# Patient Record
Sex: FEMALE | Race: WHITE | NOT HISPANIC OR LATINO | Employment: OTHER | URBAN - METROPOLITAN AREA
[De-identification: names, ages, dates, MRNs, and addresses within clinical notes are randomized per-mention and may not be internally consistent; named-entity substitution may affect disease eponyms.]

---

## 2023-09-27 DIAGNOSIS — L65.9 HAIR LOSS: ICD-10-CM

## 2023-09-27 DIAGNOSIS — M81.0 OSTEOPOROSIS, UNSPECIFIED OSTEOPOROSIS TYPE, UNSPECIFIED PATHOLOGICAL FRACTURE PRESENCE: ICD-10-CM

## 2023-09-27 DIAGNOSIS — R53.83 FATIGUE, UNSPECIFIED TYPE: ICD-10-CM

## 2023-09-27 DIAGNOSIS — E55.9 VITAMIN D DEFICIENCY: ICD-10-CM

## 2023-09-27 DIAGNOSIS — R71.8 ABNORMALITY OF RED BLOOD CELLS: ICD-10-CM

## 2023-09-27 DIAGNOSIS — E78.00 HYPERCHOLESTEROLEMIA: ICD-10-CM

## 2023-09-30 ENCOUNTER — LAB (OUTPATIENT)
Dept: LAB | Facility: LAB | Age: 83
End: 2023-09-30
Payer: MEDICARE

## 2023-09-30 DIAGNOSIS — R53.83 FATIGUE, UNSPECIFIED TYPE: ICD-10-CM

## 2023-09-30 DIAGNOSIS — L65.9 HAIR LOSS: ICD-10-CM

## 2023-09-30 DIAGNOSIS — R71.8 ABNORMALITY OF RED BLOOD CELLS: ICD-10-CM

## 2023-09-30 DIAGNOSIS — E78.00 HYPERCHOLESTEROLEMIA: ICD-10-CM

## 2023-09-30 DIAGNOSIS — E55.9 VITAMIN D DEFICIENCY: ICD-10-CM

## 2023-09-30 LAB
ALBUMIN SERPL BCP-MCNC: 4.2 G/DL (ref 3.4–5)
ALP SERPL-CCNC: 97 U/L (ref 33–136)
ALT SERPL W P-5'-P-CCNC: 24 U/L (ref 7–45)
ANION GAP SERPL CALC-SCNC: 16 MMOL/L (ref 10–20)
AST SERPL W P-5'-P-CCNC: 39 U/L (ref 9–39)
BILIRUB SERPL-MCNC: 0.7 MG/DL (ref 0–1.2)
BUN SERPL-MCNC: 13 MG/DL (ref 6–23)
CALCIUM SERPL-MCNC: 9.3 MG/DL (ref 8.6–10.3)
CHLORIDE SERPL-SCNC: 98 MMOL/L (ref 98–107)
CHOLEST SERPL-MCNC: 277 MG/DL (ref 0–199)
CHOLESTEROL/HDL RATIO: 2.5
CO2 SERPL-SCNC: 29 MMOL/L (ref 21–32)
CREAT SERPL-MCNC: 0.67 MG/DL (ref 0.5–1.05)
ERYTHROCYTE [DISTWIDTH] IN BLOOD BY AUTOMATED COUNT: 14.8 % (ref 11.5–14.5)
GFR SERPL CREATININE-BSD FRML MDRD: 88 ML/MIN/1.73M*2
GLUCOSE SERPL-MCNC: 81 MG/DL (ref 74–99)
HCT VFR BLD AUTO: 46.7 % (ref 36–46)
HDLC SERPL-MCNC: 109.4 MG/DL
HGB BLD-MCNC: 15 G/DL (ref 12–16)
LDLC SERPL CALC-MCNC: 155 MG/DL (ref 140–190)
MCH RBC QN AUTO: 29.9 PG (ref 26–34)
MCHC RBC AUTO-ENTMCNC: 32.1 G/DL (ref 32–36)
MCV RBC AUTO: 93 FL (ref 80–100)
NON HDL CHOLESTEROL: 168 MG/DL (ref 0–149)
NRBC BLD-RTO: 0 /100 WBCS (ref 0–0)
PLATELET # BLD AUTO: 247 X10*3/UL (ref 150–450)
PMV BLD AUTO: 11.5 FL (ref 7.5–11.5)
POTASSIUM SERPL-SCNC: 5 MMOL/L (ref 3.5–5.3)
PROT SERPL-MCNC: 6.5 G/DL (ref 6.4–8.2)
RBC # BLD AUTO: 5.01 X10*6/UL (ref 4–5.2)
SODIUM SERPL-SCNC: 138 MMOL/L (ref 136–145)
TRIGL SERPL-MCNC: 65 MG/DL (ref 0–149)
TSH SERPL-ACNC: 2.66 MIU/L (ref 0.44–3.98)
VLDL: 13 MG/DL (ref 0–40)
WBC # BLD AUTO: 5 X10*3/UL (ref 4.4–11.3)

## 2023-09-30 PROCEDURE — 36415 COLL VENOUS BLD VENIPUNCTURE: CPT

## 2023-10-01 LAB — 25(OH)D3 SERPL-MCNC: 69 NG/ML (ref 30–100)

## 2023-10-02 RX ORDER — ACETAMINOPHEN, DEXTROMETHORPHAN HBR, DOXYLAMINE SUCCINATE, PHENYLEPHRINE HCL 650; 20; 12.5; 1 MG/30ML; MG/30ML; MG/30ML; MG/30ML
SOLUTION ORAL
COMMUNITY
Start: 2011-01-27 | End: 2023-10-04 | Stop reason: ALTCHOICE

## 2023-10-02 RX ORDER — ZOLEDRONIC ACID 5 MG/100ML
INJECTION, SOLUTION INTRAVENOUS
COMMUNITY
Start: 2013-08-28 | End: 2023-10-04 | Stop reason: SDUPTHER

## 2023-10-02 RX ORDER — FINASTERIDE 5 MG/1
5 TABLET, FILM COATED ORAL DAILY
COMMUNITY
End: 2024-04-18 | Stop reason: ALTCHOICE

## 2023-10-02 RX ORDER — MULTIVITAMIN
1 TABLET ORAL
COMMUNITY
Start: 2012-04-12

## 2023-10-02 RX ORDER — OMEGA-3 FATTY ACIDS 1000 MG
CAPSULE ORAL
COMMUNITY
Start: 2012-04-12

## 2023-10-02 RX ORDER — SPIRONOLACTONE 50 MG/1
TABLET, FILM COATED ORAL EVERY 12 HOURS
COMMUNITY
End: 2023-10-04 | Stop reason: ALTCHOICE

## 2023-10-02 RX ORDER — ZINC GLUCONATE 100 MG
1 TABLET ORAL DAILY
COMMUNITY
Start: 2020-10-07 | End: 2023-10-04 | Stop reason: ALTCHOICE

## 2023-10-02 RX ORDER — ASPIRIN 81 MG/1
81 TABLET ORAL
COMMUNITY
End: 2023-10-04 | Stop reason: ALTCHOICE

## 2023-10-02 RX ORDER — VIT A/VIT C/VIT E/ZINC/COPPER 2148-113
TABLET ORAL
COMMUNITY
Start: 2020-10-07

## 2023-10-02 RX ORDER — VIT C/E/ZN/COPPR/LUTEIN/ZEAXAN 250MG-90MG
1000 CAPSULE ORAL
COMMUNITY
Start: 2012-03-29 | End: 2023-10-04 | Stop reason: ALTCHOICE

## 2023-10-04 ENCOUNTER — OFFICE VISIT (OUTPATIENT)
Dept: PRIMARY CARE | Facility: CLINIC | Age: 83
End: 2023-10-04
Payer: MEDICARE

## 2023-10-04 VITALS
SYSTOLIC BLOOD PRESSURE: 119 MMHG | DIASTOLIC BLOOD PRESSURE: 75 MMHG | HEART RATE: 86 BPM | HEIGHT: 59 IN | WEIGHT: 115 LBS | BODY MASS INDEX: 23.18 KG/M2 | OXYGEN SATURATION: 96 %

## 2023-10-04 DIAGNOSIS — Z00.00 ROUTINE GENERAL MEDICAL EXAMINATION AT HEALTH CARE FACILITY: ICD-10-CM

## 2023-10-04 DIAGNOSIS — Z00.00 MEDICARE ANNUAL WELLNESS VISIT, SUBSEQUENT: Primary | ICD-10-CM

## 2023-10-04 DIAGNOSIS — M85.89 OSTEOPENIA OF MULTIPLE SITES: ICD-10-CM

## 2023-10-04 PROCEDURE — 1160F RVW MEDS BY RX/DR IN RCRD: CPT | Performed by: NURSE PRACTITIONER

## 2023-10-04 PROCEDURE — G0008 ADMIN INFLUENZA VIRUS VAC: HCPCS | Performed by: NURSE PRACTITIONER

## 2023-10-04 PROCEDURE — 1159F MED LIST DOCD IN RCRD: CPT | Performed by: NURSE PRACTITIONER

## 2023-10-04 PROCEDURE — G0439 PPPS, SUBSEQ VISIT: HCPCS | Performed by: NURSE PRACTITIONER

## 2023-10-04 PROCEDURE — 99214 OFFICE O/P EST MOD 30 MIN: CPT | Performed by: NURSE PRACTITIONER

## 2023-10-04 PROCEDURE — 1170F FXNL STATUS ASSESSED: CPT | Performed by: NURSE PRACTITIONER

## 2023-10-04 PROCEDURE — 90662 IIV NO PRSV INCREASED AG IM: CPT | Performed by: NURSE PRACTITIONER

## 2023-10-04 PROCEDURE — 1126F AMNT PAIN NOTED NONE PRSNT: CPT | Performed by: NURSE PRACTITIONER

## 2023-10-04 RX ORDER — DIPHENHYDRAMINE HYDROCHLORIDE 50 MG/ML
50 INJECTION INTRAMUSCULAR; INTRAVENOUS AS NEEDED
Status: CANCELLED | OUTPATIENT
Start: 2023-10-04

## 2023-10-04 RX ORDER — ZOLEDRONIC ACID 5 MG/100ML
5 INJECTION, SOLUTION INTRAVENOUS ONCE
Qty: 100 ML | Refills: 0 | Status: SHIPPED | OUTPATIENT
Start: 2023-10-04 | End: 2024-05-28

## 2023-10-04 RX ORDER — HEPARIN 100 UNIT/ML
500 SYRINGE INTRAVENOUS AS NEEDED
Status: CANCELLED | OUTPATIENT
Start: 2023-10-04

## 2023-10-04 RX ORDER — ZOLEDRONIC ACID 5 MG/100ML
5 INJECTION, SOLUTION INTRAVENOUS ONCE
Status: CANCELLED | OUTPATIENT
Start: 2023-10-04

## 2023-10-04 RX ORDER — METHYLPREDNISOLONE SODIUM SUCCINATE 40 MG/ML
40 INJECTION INTRAMUSCULAR; INTRAVENOUS AS NEEDED
Status: CANCELLED | OUTPATIENT
Start: 2023-10-04

## 2023-10-04 RX ORDER — ALBUTEROL SULFATE 0.83 MG/ML
3 SOLUTION RESPIRATORY (INHALATION) AS NEEDED
Status: CANCELLED | OUTPATIENT
Start: 2023-10-04

## 2023-10-04 RX ORDER — EPINEPHRINE 0.3 MG/.3ML
0.3 INJECTION SUBCUTANEOUS EVERY 5 MIN PRN
Status: CANCELLED | OUTPATIENT
Start: 2023-10-04

## 2023-10-04 RX ORDER — HEPARIN SODIUM,PORCINE/PF 10 UNIT/ML
50 SYRINGE (ML) INTRAVENOUS AS NEEDED
Status: CANCELLED | OUTPATIENT
Start: 2023-10-04

## 2023-10-04 RX ORDER — FAMOTIDINE 10 MG/ML
20 INJECTION INTRAVENOUS ONCE AS NEEDED
Status: CANCELLED | OUTPATIENT
Start: 2023-10-04

## 2023-10-04 ASSESSMENT — ENCOUNTER SYMPTOMS
PALPITATIONS: 0
HEADACHES: 0
SORE THROAT: 0
NUMBNESS: 0
ABDOMINAL PAIN: 0
SHORTNESS OF BREATH: 0
MUSCULOSKELETAL NEGATIVE: 1
FATIGUE: 0
WEAKNESS: 0
COUGH: 0
CONSTIPATION: 0
VOMITING: 0
DIARRHEA: 0
PSYCHIATRIC NEGATIVE: 1
DIZZINESS: 0
FEVER: 0
CHILLS: 0
NAUSEA: 0

## 2023-10-04 ASSESSMENT — PATIENT HEALTH QUESTIONNAIRE - PHQ9
1. LITTLE INTEREST OR PLEASURE IN DOING THINGS: NOT AT ALL
2. FEELING DOWN, DEPRESSED OR HOPELESS: NOT AT ALL
SUM OF ALL RESPONSES TO PHQ9 QUESTIONS 1 AND 2: 0

## 2023-10-04 ASSESSMENT — ACTIVITIES OF DAILY LIVING (ADL)
DOING_HOUSEWORK: INDEPENDENT
MANAGING_FINANCES: INDEPENDENT
GROCERY_SHOPPING: INDEPENDENT
BATHING: INDEPENDENT
TAKING_MEDICATION: INDEPENDENT
DRESSING: INDEPENDENT

## 2023-10-04 NOTE — PROGRESS NOTES
"Subjective   Patient ID: Monica Arriaga is a 83 y.o. female who presents for medicare wellness and follow up,      HPI   Here for annual visit.  Overall feeling well.  Denies chest pain, SOB, palpitations, dizziness,  or GI issues.  Taking no prescriptions, except Reclast infusion.  Stays active.  Vital signs are stable.  Sees specialist for macular degeneration      Review of Systems   Constitutional:  Negative for chills, fatigue and fever.   HENT:  Negative for congestion, ear pain and sore throat.    Eyes:  Negative for visual disturbance.   Respiratory:  Negative for cough and shortness of breath.    Cardiovascular:  Negative for chest pain, palpitations and leg swelling.   Gastrointestinal:  Negative for abdominal pain, constipation, diarrhea, nausea and vomiting.   Genitourinary: Negative.    Musculoskeletal: Negative.    Skin:  Negative for rash.   Neurological:  Negative for dizziness, weakness, numbness and headaches.   Psychiatric/Behavioral: Negative.         Objective   /75   Pulse 86   Ht 1.499 m (4' 11\")   Wt 52.2 kg (115 lb)   SpO2 96%   BMI 23.23 kg/m²     Physical Exam  Constitutional:       General: She is not in acute distress.     Appearance: Normal appearance.   HENT:      Head: Normocephalic and atraumatic.      Right Ear: Tympanic membrane, ear canal and external ear normal.      Left Ear: Tympanic membrane, ear canal and external ear normal.      Nose: Nose normal.      Mouth/Throat:      Mouth: Mucous membranes are moist.      Pharynx: Oropharynx is clear.   Eyes:      Extraocular Movements: Extraocular movements intact.      Conjunctiva/sclera: Conjunctivae normal.      Pupils: Pupils are equal, round, and reactive to light.   Cardiovascular:      Rate and Rhythm: Normal rate and regular rhythm.      Pulses: Normal pulses.      Heart sounds: Normal heart sounds. No murmur heard.  Pulmonary:      Effort: Pulmonary effort is normal.      Breath sounds: Normal breath sounds. No " wheezing, rhonchi or rales.   Abdominal:      General: Bowel sounds are normal.      Palpations: Abdomen is soft.      Tenderness: There is no abdominal tenderness.   Musculoskeletal:         General: Normal range of motion.      Cervical back: Normal range of motion and neck supple.   Lymphadenopathy:      Comments: No lymphadenopathy noted   Skin:     General: Skin is warm and dry.      Findings: No rash.   Neurological:      General: No focal deficit present.      Mental Status: She is alert and oriented to person, place, and time.      Cranial Nerves: No cranial nerve deficit.      Coordination: Coordination normal.      Gait: Gait normal.   Psychiatric:         Mood and Affect: Mood normal.         Behavior: Behavior normal.         Assessment/Plan   Problem List Items Addressed This Visit             ICD-10-CM    Osteopenia of multiple sites M85.89     Reclast therapy as prescribed for [facility] center  Repeat bone density next year 2024         Routine general medical examination at [facility] care facility - Primary Z00.00   Reviewed recent labs.  All stable  Mammogram: Defers mammogram  Colonoscopy: Aged colon screening  Flu: Had today 10/4/2023  Shingle  Pneumonia  Follow up in 1 year or sooner if needed

## 2023-10-18 DIAGNOSIS — M85.89 OSTEOPENIA OF MULTIPLE SITES: Primary | ICD-10-CM

## 2023-10-18 RX ORDER — FAMOTIDINE 10 MG/ML
20 INJECTION INTRAVENOUS ONCE AS NEEDED
OUTPATIENT
Start: 2023-10-18

## 2023-10-18 RX ORDER — DIPHENHYDRAMINE HYDROCHLORIDE 50 MG/ML
50 INJECTION INTRAMUSCULAR; INTRAVENOUS AS NEEDED
OUTPATIENT
Start: 2023-10-18

## 2023-10-18 RX ORDER — ZOLEDRONIC ACID 5 MG/100ML
5 INJECTION, SOLUTION INTRAVENOUS ONCE
OUTPATIENT
Start: 2023-10-18

## 2023-10-18 RX ORDER — ALBUTEROL SULFATE 0.83 MG/ML
3 SOLUTION RESPIRATORY (INHALATION) AS NEEDED
OUTPATIENT
Start: 2023-10-18

## 2023-10-18 RX ORDER — EPINEPHRINE 0.3 MG/.3ML
0.3 INJECTION SUBCUTANEOUS EVERY 5 MIN PRN
OUTPATIENT
Start: 2023-10-18

## 2023-11-01 DIAGNOSIS — M85.89 OSTEOPENIA OF MULTIPLE SITES: ICD-10-CM

## 2023-11-06 ENCOUNTER — APPOINTMENT (OUTPATIENT)
Dept: HEMATOLOGY/ONCOLOGY | Facility: CLINIC | Age: 83
End: 2023-11-06
Payer: MEDICARE

## 2023-11-15 ENCOUNTER — INFUSION (OUTPATIENT)
Dept: HEMATOLOGY/ONCOLOGY | Facility: CLINIC | Age: 83
End: 2023-11-15
Payer: MEDICARE

## 2023-11-15 VITALS
HEIGHT: 60 IN | TEMPERATURE: 97.5 F | RESPIRATION RATE: 16 BRPM | HEART RATE: 75 BPM | OXYGEN SATURATION: 97 % | WEIGHT: 113.32 LBS | SYSTOLIC BLOOD PRESSURE: 115 MMHG | DIASTOLIC BLOOD PRESSURE: 75 MMHG | BODY MASS INDEX: 22.25 KG/M2

## 2023-11-15 DIAGNOSIS — Z00.00 ROUTINE GENERAL MEDICAL EXAMINATION AT HEALTH CARE FACILITY: ICD-10-CM

## 2023-11-15 DIAGNOSIS — M85.89 OSTEOPENIA OF MULTIPLE SITES: ICD-10-CM

## 2023-11-15 LAB
ALBUMIN SERPL BCP-MCNC: 4.2 G/DL (ref 3.4–5)
ALP SERPL-CCNC: 95 U/L (ref 33–136)
ALT SERPL W P-5'-P-CCNC: 22 U/L (ref 7–45)
ANION GAP SERPL CALC-SCNC: 12 MMOL/L (ref 10–20)
AST SERPL W P-5'-P-CCNC: 30 U/L (ref 9–39)
BILIRUB SERPL-MCNC: 0.5 MG/DL (ref 0–1.2)
BUN SERPL-MCNC: 12 MG/DL (ref 6–23)
CALCIUM SERPL-MCNC: 9.2 MG/DL (ref 8.6–10.3)
CHLORIDE SERPL-SCNC: 101 MMOL/L (ref 98–107)
CO2 SERPL-SCNC: 32 MMOL/L (ref 21–32)
CREAT SERPL-MCNC: 0.71 MG/DL (ref 0.5–1.05)
GFR SERPL CREATININE-BSD FRML MDRD: 86 ML/MIN/1.73M*2
GLUCOSE SERPL-MCNC: 112 MG/DL (ref 74–99)
POTASSIUM SERPL-SCNC: 3.8 MMOL/L (ref 3.5–5.3)
PROT SERPL-MCNC: 6.3 G/DL (ref 6.4–8.2)
SODIUM SERPL-SCNC: 141 MMOL/L (ref 136–145)

## 2023-11-15 PROCEDURE — 96374 THER/PROPH/DIAG INJ IV PUSH: CPT | Mod: INF

## 2023-11-15 PROCEDURE — 36415 COLL VENOUS BLD VENIPUNCTURE: CPT

## 2023-11-15 PROCEDURE — 80053 COMPREHEN METABOLIC PANEL: CPT | Performed by: INTERNAL MEDICINE

## 2023-11-15 PROCEDURE — 2500000004 HC RX 250 GENERAL PHARMACY W/ HCPCS (ALT 636 FOR OP/ED): Performed by: NURSE PRACTITIONER

## 2023-11-15 RX ORDER — ZOLEDRONIC ACID 5 MG/100ML
5 INJECTION, SOLUTION INTRAVENOUS ONCE
Status: COMPLETED | OUTPATIENT
Start: 2023-11-15 | End: 2023-11-15

## 2023-11-15 RX ORDER — EPINEPHRINE 0.3 MG/.3ML
0.3 INJECTION SUBCUTANEOUS EVERY 5 MIN PRN
Status: DISCONTINUED | OUTPATIENT
Start: 2023-11-15 | End: 2023-11-15 | Stop reason: HOSPADM

## 2023-11-15 RX ORDER — EPINEPHRINE 0.3 MG/.3ML
0.3 INJECTION SUBCUTANEOUS EVERY 5 MIN PRN
OUTPATIENT
Start: 2023-11-15

## 2023-11-15 RX ORDER — DIPHENHYDRAMINE HYDROCHLORIDE 50 MG/ML
50 INJECTION INTRAMUSCULAR; INTRAVENOUS AS NEEDED
Status: DISCONTINUED | OUTPATIENT
Start: 2023-11-15 | End: 2023-11-15 | Stop reason: HOSPADM

## 2023-11-15 RX ORDER — ALBUTEROL SULFATE 0.83 MG/ML
3 SOLUTION RESPIRATORY (INHALATION) AS NEEDED
OUTPATIENT
Start: 2023-11-15

## 2023-11-15 RX ORDER — HEPARIN SODIUM,PORCINE/PF 10 UNIT/ML
50 SYRINGE (ML) INTRAVENOUS AS NEEDED
OUTPATIENT
Start: 2023-11-15

## 2023-11-15 RX ORDER — ALBUTEROL SULFATE 0.83 MG/ML
3 SOLUTION RESPIRATORY (INHALATION) AS NEEDED
Status: DISCONTINUED | OUTPATIENT
Start: 2023-11-15 | End: 2023-11-15 | Stop reason: HOSPADM

## 2023-11-15 RX ORDER — DIPHENHYDRAMINE HYDROCHLORIDE 50 MG/ML
50 INJECTION INTRAMUSCULAR; INTRAVENOUS AS NEEDED
OUTPATIENT
Start: 2023-11-15

## 2023-11-15 RX ORDER — FAMOTIDINE 10 MG/ML
20 INJECTION INTRAVENOUS ONCE AS NEEDED
OUTPATIENT
Start: 2023-11-15

## 2023-11-15 RX ORDER — HEPARIN 100 UNIT/ML
500 SYRINGE INTRAVENOUS AS NEEDED
OUTPATIENT
Start: 2023-11-15

## 2023-11-15 RX ORDER — ZOLEDRONIC ACID 5 MG/100ML
5 INJECTION, SOLUTION INTRAVENOUS ONCE
Status: CANCELLED | OUTPATIENT
Start: 2023-11-15

## 2023-11-15 RX ORDER — FAMOTIDINE 10 MG/ML
20 INJECTION INTRAVENOUS ONCE AS NEEDED
Status: DISCONTINUED | OUTPATIENT
Start: 2023-11-15 | End: 2023-11-15 | Stop reason: HOSPADM

## 2023-11-15 RX ADMIN — ZOLEDRONIC ACID 5 MG: 0.05 INJECTION, SOLUTION INTRAVENOUS at 11:55

## 2023-11-15 ASSESSMENT — PAIN SCALES - GENERAL: PAINLEVEL: 0-NO PAIN

## 2023-12-07 PROBLEM — E55.9 VITAMIN D DEFICIENCY: Status: ACTIVE | Noted: 2023-12-07

## 2023-12-07 PROBLEM — M15.9 OSTEOARTHRITIS, GENERALIZED: Status: ACTIVE | Noted: 2023-12-07

## 2023-12-07 PROBLEM — S06.5XAA SUBDURAL HEMATOMA (MULTI): Status: ACTIVE | Noted: 2023-12-07

## 2023-12-07 PROBLEM — R74.8 ABNORMAL LIVER ENZYMES: Status: ACTIVE | Noted: 2023-12-07

## 2023-12-07 PROBLEM — M51.379 DEGENERATION OF INTERVERTEBRAL DISC OF LUMBOSACRAL REGION: Status: ACTIVE | Noted: 2023-12-07

## 2023-12-07 PROBLEM — M47.16 LUMBAR SPONDYLOSIS WITH MYELOPATHY: Status: ACTIVE | Noted: 2023-12-07

## 2023-12-07 PROBLEM — M75.22 BICEPS TENDINITIS OF LEFT SHOULDER: Status: ACTIVE | Noted: 2023-12-07

## 2023-12-07 PROBLEM — H35.30 MACULAR DEGENERATION: Status: ACTIVE | Noted: 2023-12-07

## 2023-12-07 PROBLEM — S01.01XA SCALP LACERATION: Status: ACTIVE | Noted: 2023-12-07

## 2023-12-07 PROBLEM — R71.8 ABNORMALITY OF RED BLOOD CELLS: Status: ACTIVE | Noted: 2023-12-07

## 2023-12-07 PROBLEM — M17.11 PRIMARY OSTEOARTHRITIS OF RIGHT KNEE: Status: ACTIVE | Noted: 2023-12-07

## 2023-12-07 PROBLEM — M47.26 OSTEOARTHRITIS OF SPINE WITH RADICULOPATHY, LUMBAR REGION: Status: ACTIVE | Noted: 2023-12-07

## 2023-12-07 PROBLEM — S09.90XA CLOSED HEAD INJURY: Status: ACTIVE | Noted: 2023-12-07

## 2023-12-07 PROBLEM — H26.9 CATARACTA: Status: ACTIVE | Noted: 2023-12-07

## 2023-12-07 PROBLEM — L30.9 DERMATITIS: Status: ACTIVE | Noted: 2023-12-07

## 2023-12-07 PROBLEM — R06.09 DYSPNEA ON EXERTION: Status: ACTIVE | Noted: 2023-12-07

## 2023-12-07 PROBLEM — J02.9 SORE THROAT: Status: ACTIVE | Noted: 2023-12-07

## 2023-12-07 PROBLEM — M43.16 SPONDYLOLISTHESIS OF LUMBAR REGION: Status: ACTIVE | Noted: 2023-12-07

## 2023-12-07 PROBLEM — M51.37 DEGENERATION OF INTERVERTEBRAL DISC OF LUMBOSACRAL REGION: Status: ACTIVE | Noted: 2023-12-07

## 2023-12-07 PROBLEM — M25.549 HAND JOINT PAIN: Status: ACTIVE | Noted: 2023-12-07

## 2023-12-07 PROBLEM — M51.26 DISPLACEMENT OF LUMBAR INTERVERTEBRAL DISC: Status: ACTIVE | Noted: 2023-12-07

## 2023-12-07 PROBLEM — D75.1 POLYCYTHEMIA: Status: ACTIVE | Noted: 2023-12-07

## 2023-12-07 PROBLEM — R53.83 FATIGUE: Status: ACTIVE | Noted: 2023-12-07

## 2023-12-07 PROBLEM — M25.559 PAIN IN JOINT INVOLVING PELVIC REGION AND THIGH: Status: ACTIVE | Noted: 2023-12-07

## 2023-12-07 PROBLEM — M16.11 PRIMARY OSTEOARTHRITIS OF RIGHT HIP: Status: ACTIVE | Noted: 2023-12-07

## 2023-12-07 PROBLEM — L64.9 ANDROGENIC ALOPECIA: Status: ACTIVE | Noted: 2023-12-07

## 2023-12-07 PROBLEM — I31.9 PERICARDITIS (HHS-HCC): Status: ACTIVE | Noted: 2023-12-07

## 2023-12-07 PROBLEM — R79.82 ELEVATED C-REACTIVE PROTEIN: Status: ACTIVE | Noted: 2023-12-07

## 2024-04-18 ENCOUNTER — OFFICE VISIT (OUTPATIENT)
Dept: PRIMARY CARE | Facility: CLINIC | Age: 84
End: 2024-04-18
Payer: MEDICARE

## 2024-04-18 VITALS
OXYGEN SATURATION: 94 % | DIASTOLIC BLOOD PRESSURE: 70 MMHG | HEART RATE: 84 BPM | WEIGHT: 114.6 LBS | BODY MASS INDEX: 23.1 KG/M2 | HEIGHT: 59 IN | SYSTOLIC BLOOD PRESSURE: 110 MMHG

## 2024-04-18 DIAGNOSIS — J06.9 UPPER RESPIRATORY TRACT INFECTION, UNSPECIFIED TYPE: Primary | ICD-10-CM

## 2024-04-18 DIAGNOSIS — S06.5X9A TRAUMATIC SUBDURAL HEMORRHAGE WITH LOSS OF CONSCIOUSNESS OF UNSPECIFIED DURATION, INITIAL ENCOUNTER (MULTI): ICD-10-CM

## 2024-04-18 PROBLEM — S06.5XAA SUBDURAL HEMATOMA (MULTI): Status: RESOLVED | Noted: 2023-12-07 | Resolved: 2024-04-18

## 2024-04-18 PROCEDURE — 1124F ACP DISCUSS-NO DSCNMKR DOCD: CPT | Performed by: NURSE PRACTITIONER

## 2024-04-18 PROCEDURE — 1160F RVW MEDS BY RX/DR IN RCRD: CPT | Performed by: NURSE PRACTITIONER

## 2024-04-18 PROCEDURE — 1036F TOBACCO NON-USER: CPT | Performed by: NURSE PRACTITIONER

## 2024-04-18 PROCEDURE — 1159F MED LIST DOCD IN RCRD: CPT | Performed by: NURSE PRACTITIONER

## 2024-04-18 PROCEDURE — 99214 OFFICE O/P EST MOD 30 MIN: CPT | Performed by: NURSE PRACTITIONER

## 2024-04-18 RX ORDER — ALBUTEROL SULFATE 90 UG/1
2 AEROSOL, METERED RESPIRATORY (INHALATION) EVERY 4 HOURS PRN
Qty: 8 G | Refills: 1 | Status: SHIPPED | OUTPATIENT
Start: 2024-04-18 | End: 2024-04-18

## 2024-04-18 RX ORDER — DOXYCYCLINE 100 MG/1
100 CAPSULE ORAL 2 TIMES DAILY
Qty: 14 CAPSULE | Refills: 0 | Status: SHIPPED | OUTPATIENT
Start: 2024-04-18 | End: 2024-04-18 | Stop reason: WASHOUT

## 2024-04-18 RX ORDER — DOXYCYCLINE 100 MG/1
100 CAPSULE ORAL 2 TIMES DAILY
Qty: 14 CAPSULE | Refills: 0 | Status: SHIPPED | OUTPATIENT
Start: 2024-04-18 | End: 2024-04-25

## 2024-04-18 RX ORDER — ALBUTEROL SULFATE 90 UG/1
2 AEROSOL, METERED RESPIRATORY (INHALATION) EVERY 4 HOURS PRN
Qty: 8 G | Refills: 1 | Status: SHIPPED | OUTPATIENT
Start: 2024-04-18 | End: 2024-05-28

## 2024-04-18 ASSESSMENT — ENCOUNTER SYMPTOMS
CHOKING: 1
GASTROINTESTINAL NEGATIVE: 1
EYES NEGATIVE: 1
CONSTITUTIONAL NEGATIVE: 1
CARDIOVASCULAR NEGATIVE: 1
PSYCHIATRIC NEGATIVE: 1
NEUROLOGICAL NEGATIVE: 1
COUGH: 1

## 2024-04-18 ASSESSMENT — PATIENT HEALTH QUESTIONNAIRE - PHQ9
SUM OF ALL RESPONSES TO PHQ9 QUESTIONS 1 AND 2: 0
1. LITTLE INTEREST OR PLEASURE IN DOING THINGS: NOT AT ALL
2. FEELING DOWN, DEPRESSED OR HOPELESS: NOT AT ALL

## 2024-04-18 NOTE — PROGRESS NOTES
"Subjective   Patient ID: Monica Arriaga is a 83 y.o. female who presents for follow up.    HPI   Feeling better everyday.  Had episode of shingles, has cleared up.  Onset: 19 days ago  Symptoms: very productive cough of thick yellow sputum, choking on the phlegm  Sore throat: no  N/V/D: no  Fever: no  OTC medications: none  COVID Test: none taken    Review of Systems   Constitutional: Negative.    HENT:  Positive for congestion.    Eyes: Negative.    Respiratory:  Positive for cough and choking.    Cardiovascular: Negative.    Gastrointestinal: Negative.    Genitourinary: Negative.    Skin: Negative.    Neurological: Negative.    Psychiatric/Behavioral: Negative.         Objective   /70   Pulse 84   Ht 1.499 m (4' 11\")   Wt 52 kg (114 lb 9.6 oz)   SpO2 94%   BMI 23.15 kg/m²     Physical Exam  Constitutional:       General: She is not in acute distress.     Appearance: Normal appearance.   HENT:      Head: Normocephalic and atraumatic.      Right Ear: Tympanic membrane, ear canal and external ear normal.      Left Ear: Tympanic membrane, ear canal and external ear normal.      Nose: Nose normal.      Mouth/Throat:      Mouth: Mucous membranes are moist.      Pharynx: Oropharynx is clear.   Eyes:      Extraocular Movements: Extraocular movements intact.      Conjunctiva/sclera: Conjunctivae normal.      Pupils: Pupils are equal, round, and reactive to light.   Cardiovascular:      Rate and Rhythm: Normal rate and regular rhythm.      Pulses: Normal pulses.      Heart sounds: Normal heart sounds. No murmur heard.  Pulmonary:      Effort: Pulmonary effort is normal.      Breath sounds: Normal breath sounds. No wheezing, rhonchi or rales.   Abdominal:      General: Bowel sounds are normal.      Palpations: Abdomen is soft.      Tenderness: There is no abdominal tenderness.   Musculoskeletal:         General: Normal range of motion.      Cervical back: Normal range of motion and neck supple.   Lymphadenopathy: "      Comments: No lymphadenopathy noted   Skin:     General: Skin is warm and dry.      Findings: No rash.   Neurological:      General: No focal deficit present.      Mental Status: She is alert and oriented to person, place, and time.      Cranial Nerves: No cranial nerve deficit.      Coordination: Coordination normal.      Gait: Gait normal.   Psychiatric:         Mood and Affect: Mood normal.         Behavior: Behavior normal.         Assessment/Plan   Problem List Items Addressed This Visit             ICD-10-CM    Traumatic subdural hemorrhage with loss of consciousness of unspecified duration, initial encounter (Multi) S06.5X9A     Resolved still montior         Upper respiratory tract infection - Primary J06.9    Relevant Medications    doxycycline (Vibramycin) 100 mg capsule    albuterol (Ventolin HFA) 90 mcg/actuation inhaler   Follow up as needed

## 2024-05-28 ENCOUNTER — OFFICE VISIT (OUTPATIENT)
Dept: PRIMARY CARE | Facility: CLINIC | Age: 84
End: 2024-05-28
Payer: MEDICARE

## 2024-05-28 VITALS
SYSTOLIC BLOOD PRESSURE: 125 MMHG | WEIGHT: 114 LBS | DIASTOLIC BLOOD PRESSURE: 80 MMHG | HEART RATE: 105 BPM | HEIGHT: 59 IN | BODY MASS INDEX: 22.98 KG/M2

## 2024-05-28 DIAGNOSIS — R09.89 PHLEGM IN THROAT: ICD-10-CM
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
## 2024-05-28 PROCEDURE — 1158F ADVNC CARE PLAN TLK DOCD: CPT | Performed by: NURSE PRACTITIONER

## 2024-05-28 PROCEDURE — 99214 OFFICE O/P EST MOD 30 MIN: CPT | Performed by: NURSE PRACTITIONER

## 2024-05-28 PROCEDURE — 1159F MED LIST DOCD IN RCRD: CPT | Performed by: NURSE PRACTITIONER

## 2024-05-28 PROCEDURE — 1160F RVW MEDS BY RX/DR IN RCRD: CPT | Performed by: NURSE PRACTITIONER

## 2024-05-28 PROCEDURE — 1123F ACP DISCUSS/DSCN MKR DOCD: CPT | Performed by: NURSE PRACTITIONER

## 2024-05-28 PROCEDURE — 1036F TOBACCO NON-USER: CPT | Performed by: NURSE PRACTITIONER

## 2024-05-28 RX ORDER — FINASTERIDE 5 MG/1
5 TABLET, FILM COATED ORAL DAILY
COMMUNITY
Start: 2024-05-23

## 2024-05-28 ASSESSMENT — PATIENT HEALTH QUESTIONNAIRE - PHQ9
2. FEELING DOWN, DEPRESSED OR HOPELESS: NOT AT ALL
SUM OF ALL RESPONSES TO PHQ9 QUESTIONS 1 AND 2: 0
1. LITTLE INTEREST OR PLEASURE IN DOING THINGS: NOT AT ALL

## 2024-05-28 NOTE — PROGRESS NOTES
"Subjective   Patient ID: Monica Arriaga is a 83 y.o. female who presents for bug bites.  Herpes Zoster       Noted a few itching red spots, arm thigh and back of knee.  Concerns it may be shingles outbreak.  Also still has phlegm in throat from illness.    Review of Systems   HENT:          Phlegm in throat   Skin:         Small red itchy bumps, one on outer right thigh and right forearm.  Several to back of left knee.  Itching and red.  Not open.       Objective   /80   Pulse 105   Ht 1.499 m (4' 11\")   Wt 51.7 kg (114 lb)   BMI 23.03 kg/m²     Physical Exam  Constitutional:       General: She is not in acute distress.     Appearance: Normal appearance.   HENT:      Head: Normocephalic and atraumatic.      Right Ear: Tympanic membrane, ear canal and external ear normal.      Left Ear: Tympanic membrane, ear canal and external ear normal.      Nose: Nose normal.      Mouth/Throat:      Mouth: Mucous membranes are moist.      Pharynx: Oropharynx is clear.   Eyes:      Extraocular Movements: Extraocular movements intact.      Conjunctiva/sclera: Conjunctivae normal.      Pupils: Pupils are equal, round, and reactive to light.   Cardiovascular:      Rate and Rhythm: Normal rate and regular rhythm.      Pulses: Normal pulses.      Heart sounds: Normal heart sounds. No murmur heard.  Pulmonary:      Effort: Pulmonary effort is normal.      Breath sounds: Normal breath sounds. No wheezing, rhonchi or rales.   Abdominal:      General: Bowel sounds are normal.      Palpations: Abdomen is soft.      Tenderness: There is no abdominal tenderness.   Musculoskeletal:         General: Normal range of motion.      Cervical back: Normal range of motion and neck supple.   Lymphadenopathy:      Comments: No lymphadenopathy noted   Skin:     General: Skin is warm and dry.      Findings: Lesion present. No rash.      Comments: Small red bite looking red spots. Not open.   Neurological:      General: No focal deficit present. "      Mental Status: She is alert and oriented to person, place, and time.      Cranial Nerves: No cranial nerve deficit.      Coordination: Coordination normal.      Gait: Gait normal.   Psychiatric:         Mood and Affect: Mood normal.         Behavior: Behavior normal.         Assessment/Plan   Problem List Items Addressed This Visit             ICD-10-CM    Bug bites - Primary W57.XXXA     Over the counter hydrocortisone         Phlegm in throat R09.89     Try mucinex and flonase for 2 weeks.  Will consider GERD treatment if does not resolve.        Follow up as needed.

## 2024-05-28 NOTE — PATIENT INSTRUCTIONS
Mucinex  Flonase  Try both for two weeks to see if it resolves.  May consider reflux medications if these don't act work.  Will try try to see to go away Flonase, sinus open      Bug bites:  Hydrocortisone cream over the counter cream as needed for itch.

## 2024-05-28 NOTE — PROGRESS NOTES
Patient Active Problem List   Diagnosis    Osteopenia of multiple sites    Routine general medical examination at health care facility    Abnormal liver enzymes    Androgenic alopecia    Biceps tendinitis of left shoulder    Cataracta    Closed head injury    Degeneration of intervertebral disc of lumbosacral region    Displacement of lumbar intervertebral disc    Dyspnea on exertion    Elevated C-reactive protein    Fatigue    Hair loss    Pain in joint involving pelvic region and thigh    Hand joint pain    Lumbar spondylosis with myelopathy    Macular degeneration    Osteoarthritis of spine with radiculopathy, lumbar region    Osteoarthritis, generalized    Other chest pain    Dermatitis    Pericarditis (HHS-HCC)    Polycythemia    Primary osteoarthritis of right hip    Primary osteoarthritis of right knee    Pure hypercholesterolemia    Scalp laceration    Abnormality of red blood cells    Senile osteoporosis    Sore throat    Spondylolisthesis of lumbar region    Vitamin D deficiency    Traumatic subdural hemorrhage with loss of consciousness of unspecified duration, initial encounter (Multi)    Upper respiratory tract infection    Bug bites    Phlegm in throat

## 2024-05-29 ENCOUNTER — APPOINTMENT (OUTPATIENT)
Dept: PRIMARY CARE | Facility: CLINIC | Age: 84
End: 2024-05-29
Payer: MEDICARE

## 2024-08-15 ENCOUNTER — APPOINTMENT (OUTPATIENT)
Dept: CARDIOLOGY | Facility: HOSPITAL | Age: 84
End: 2024-08-15
Payer: MEDICARE

## 2024-08-15 ENCOUNTER — APPOINTMENT (OUTPATIENT)
Dept: RADIOLOGY | Facility: HOSPITAL | Age: 84
End: 2024-08-15
Payer: MEDICARE

## 2024-08-15 ENCOUNTER — HOSPITAL ENCOUNTER (OUTPATIENT)
Facility: HOSPITAL | Age: 84
Setting detail: OBSERVATION
Discharge: HOME | End: 2024-08-17
Attending: EMERGENCY MEDICINE | Admitting: HOSPITALIST
Payer: MEDICARE

## 2024-08-15 DIAGNOSIS — R07.9 NONSPECIFIC CHEST PAIN: ICD-10-CM

## 2024-08-15 DIAGNOSIS — Z76.89 ESTABLISHING CARE WITH NEW DOCTOR, ENCOUNTER FOR: ICD-10-CM

## 2024-08-15 DIAGNOSIS — J18.9 MULTIFOCAL PNEUMONIA: Primary | ICD-10-CM

## 2024-08-15 DIAGNOSIS — R09.89 PHLEGM IN THROAT: ICD-10-CM

## 2024-08-15 LAB
ALBUMIN SERPL BCP-MCNC: 3.8 G/DL (ref 3.4–5)
ALP SERPL-CCNC: 160 U/L (ref 33–136)
ALT SERPL W P-5'-P-CCNC: 28 U/L (ref 7–45)
ANION GAP SERPL CALC-SCNC: 16 MMOL/L (ref 10–20)
APPEARANCE UR: CLEAR
AST SERPL W P-5'-P-CCNC: 40 U/L (ref 9–39)
BASOPHILS # BLD AUTO: 0.06 X10*3/UL (ref 0–0.1)
BASOPHILS NFR BLD AUTO: 0.5 %
BILIRUB SERPL-MCNC: 1.1 MG/DL (ref 0–1.2)
BILIRUB UR STRIP.AUTO-MCNC: NEGATIVE MG/DL
BNP SERPL-MCNC: 224 PG/ML (ref 0–99)
BUN SERPL-MCNC: 9 MG/DL (ref 6–23)
CALCIUM SERPL-MCNC: 8.8 MG/DL (ref 8.6–10.3)
CARDIAC TROPONIN I PNL SERPL HS: 7 NG/L (ref 0–13)
CHLORIDE SERPL-SCNC: 101 MMOL/L (ref 98–107)
CO2 SERPL-SCNC: 27 MMOL/L (ref 21–32)
COLOR UR: YELLOW
CREAT SERPL-MCNC: 0.6 MG/DL (ref 0.5–1.05)
EGFRCR SERPLBLD CKD-EPI 2021: 90 ML/MIN/1.73M*2
EOSINOPHIL # BLD AUTO: 0.07 X10*3/UL (ref 0–0.4)
EOSINOPHIL NFR BLD AUTO: 0.6 %
ERYTHROCYTE [DISTWIDTH] IN BLOOD BY AUTOMATED COUNT: 14.3 % (ref 11.5–14.5)
GLUCOSE SERPL-MCNC: 97 MG/DL (ref 74–99)
GLUCOSE UR STRIP.AUTO-MCNC: NORMAL MG/DL
HCT VFR BLD AUTO: 47.6 % (ref 36–46)
HGB BLD-MCNC: 16.1 G/DL (ref 12–16)
IMM GRANULOCYTES # BLD AUTO: 0.05 X10*3/UL (ref 0–0.5)
IMM GRANULOCYTES NFR BLD AUTO: 0.4 % (ref 0–0.9)
INR PPP: 1.1 (ref 0.9–1.1)
KETONES UR STRIP.AUTO-MCNC: ABNORMAL MG/DL
LEUKOCYTE ESTERASE UR QL STRIP.AUTO: ABNORMAL
LIPASE SERPL-CCNC: 6 U/L (ref 9–82)
LYMPHOCYTES # BLD AUTO: 1.12 X10*3/UL (ref 0.8–3)
LYMPHOCYTES NFR BLD AUTO: 9.2 %
MAGNESIUM SERPL-MCNC: 1.8 MG/DL (ref 1.6–2.4)
MCH RBC QN AUTO: 30.2 PG (ref 26–34)
MCHC RBC AUTO-ENTMCNC: 33.8 G/DL (ref 32–36)
MCV RBC AUTO: 89 FL (ref 80–100)
MONOCYTES # BLD AUTO: 1.71 X10*3/UL (ref 0.05–0.8)
MONOCYTES NFR BLD AUTO: 14.1 %
NEUTROPHILS # BLD AUTO: 9.12 X10*3/UL (ref 1.6–5.5)
NEUTROPHILS NFR BLD AUTO: 75.2 %
NITRITE UR QL STRIP.AUTO: NEGATIVE
NRBC BLD-RTO: 0 /100 WBCS (ref 0–0)
PH UR STRIP.AUTO: 6.5 [PH]
PLATELET # BLD AUTO: 240 X10*3/UL (ref 150–450)
POTASSIUM SERPL-SCNC: 3.5 MMOL/L (ref 3.5–5.3)
PROT SERPL-MCNC: 6.7 G/DL (ref 6.4–8.2)
PROT UR STRIP.AUTO-MCNC: NEGATIVE MG/DL
PROTHROMBIN TIME: 12.5 SECONDS (ref 9.8–12.8)
RBC # BLD AUTO: 5.33 X10*6/UL (ref 4–5.2)
RBC # UR STRIP.AUTO: NEGATIVE /UL
RBC #/AREA URNS AUTO: NORMAL /HPF
SARS-COV-2 RNA RESP QL NAA+PROBE: NOT DETECTED
SODIUM SERPL-SCNC: 140 MMOL/L (ref 136–145)
SP GR UR STRIP.AUTO: 1.01
SQUAMOUS #/AREA URNS AUTO: NORMAL /HPF
UROBILINOGEN UR STRIP.AUTO-MCNC: NORMAL MG/DL
WBC # BLD AUTO: 12.1 X10*3/UL (ref 4.4–11.3)
WBC #/AREA URNS AUTO: NORMAL /HPF
WBC CLUMPS #/AREA URNS AUTO: NORMAL /HPF

## 2024-08-15 PROCEDURE — 71250 CT THORAX DX C-: CPT | Performed by: RADIOLOGY

## 2024-08-15 PROCEDURE — 93005 ELECTROCARDIOGRAM TRACING: CPT

## 2024-08-15 PROCEDURE — G0378 HOSPITAL OBSERVATION PER HR: HCPCS

## 2024-08-15 PROCEDURE — 83880 ASSAY OF NATRIURETIC PEPTIDE: CPT | Performed by: HEALTH CARE PROVIDER

## 2024-08-15 PROCEDURE — 87899 AGENT NOS ASSAY W/OPTIC: CPT | Mod: GEALAB

## 2024-08-15 PROCEDURE — 85610 PROTHROMBIN TIME: CPT | Performed by: HEALTH CARE PROVIDER

## 2024-08-15 PROCEDURE — 83735 ASSAY OF MAGNESIUM: CPT | Performed by: EMERGENCY MEDICINE

## 2024-08-15 PROCEDURE — 87635 SARS-COV-2 COVID-19 AMP PRB: CPT | Performed by: HEALTH CARE PROVIDER

## 2024-08-15 PROCEDURE — 96365 THER/PROPH/DIAG IV INF INIT: CPT

## 2024-08-15 PROCEDURE — 87086 URINE CULTURE/COLONY COUNT: CPT | Mod: GEALAB | Performed by: HEALTH CARE PROVIDER

## 2024-08-15 PROCEDURE — 96368 THER/DIAG CONCURRENT INF: CPT

## 2024-08-15 PROCEDURE — 71046 X-RAY EXAM CHEST 2 VIEWS: CPT

## 2024-08-15 PROCEDURE — 87635 SARS-COV-2 COVID-19 AMP PRB: CPT | Performed by: EMERGENCY MEDICINE

## 2024-08-15 PROCEDURE — 94760 N-INVAS EAR/PLS OXIMETRY 1: CPT

## 2024-08-15 PROCEDURE — 36415 COLL VENOUS BLD VENIPUNCTURE: CPT | Performed by: EMERGENCY MEDICINE

## 2024-08-15 PROCEDURE — 71046 X-RAY EXAM CHEST 2 VIEWS: CPT | Performed by: RADIOLOGY

## 2024-08-15 PROCEDURE — 99285 EMERGENCY DEPT VISIT HI MDM: CPT | Mod: 25

## 2024-08-15 PROCEDURE — 85025 COMPLETE CBC W/AUTO DIFF WBC: CPT | Performed by: EMERGENCY MEDICINE

## 2024-08-15 PROCEDURE — 2500000001 HC RX 250 WO HCPCS SELF ADMINISTERED DRUGS (ALT 637 FOR MEDICARE OP)

## 2024-08-15 PROCEDURE — 71250 CT THORAX DX C-: CPT

## 2024-08-15 PROCEDURE — 83690 ASSAY OF LIPASE: CPT | Performed by: HEALTH CARE PROVIDER

## 2024-08-15 PROCEDURE — 84484 ASSAY OF TROPONIN QUANT: CPT | Performed by: EMERGENCY MEDICINE

## 2024-08-15 PROCEDURE — 87040 BLOOD CULTURE FOR BACTERIA: CPT | Mod: GEALAB | Performed by: HEALTH CARE PROVIDER

## 2024-08-15 PROCEDURE — 87449 NOS EACH ORGANISM AG IA: CPT | Mod: GEALAB

## 2024-08-15 PROCEDURE — 2500000004 HC RX 250 GENERAL PHARMACY W/ HCPCS (ALT 636 FOR OP/ED): Performed by: HEALTH CARE PROVIDER

## 2024-08-15 PROCEDURE — 36415 COLL VENOUS BLD VENIPUNCTURE: CPT | Performed by: HEALTH CARE PROVIDER

## 2024-08-15 PROCEDURE — 81001 URINALYSIS AUTO W/SCOPE: CPT | Performed by: HEALTH CARE PROVIDER

## 2024-08-15 PROCEDURE — 80053 COMPREHEN METABOLIC PANEL: CPT | Performed by: EMERGENCY MEDICINE

## 2024-08-15 RX ORDER — ACETAMINOPHEN 325 MG/1
650 TABLET ORAL EVERY 6 HOURS PRN
COMMUNITY

## 2024-08-15 RX ORDER — ENOXAPARIN SODIUM 100 MG/ML
40 INJECTION SUBCUTANEOUS EVERY 24 HOURS
Status: DISCONTINUED | OUTPATIENT
Start: 2024-08-15 | End: 2024-08-17 | Stop reason: HOSPADM

## 2024-08-15 RX ORDER — CEFTRIAXONE 2 G/50ML
2 INJECTION, SOLUTION INTRAVENOUS EVERY 24 HOURS
Status: DISCONTINUED | OUTPATIENT
Start: 2024-08-15 | End: 2024-08-15

## 2024-08-15 RX ORDER — CEFTRIAXONE 1 G/50ML
1 INJECTION, SOLUTION INTRAVENOUS EVERY 24 HOURS
Status: DISCONTINUED | OUTPATIENT
Start: 2024-08-16 | End: 2024-08-17 | Stop reason: HOSPADM

## 2024-08-15 RX ORDER — ALBUTEROL SULFATE 90 UG/1
2 INHALANT RESPIRATORY (INHALATION) EVERY 4 HOURS PRN
Status: DISCONTINUED | OUTPATIENT
Start: 2024-08-15 | End: 2024-08-17 | Stop reason: HOSPADM

## 2024-08-15 RX ORDER — AMOXICILLIN 250 MG
2 CAPSULE ORAL 2 TIMES DAILY
Status: DISCONTINUED | OUTPATIENT
Start: 2024-08-15 | End: 2024-08-17 | Stop reason: HOSPADM

## 2024-08-15 RX ORDER — IPRATROPIUM BROMIDE AND ALBUTEROL SULFATE 2.5; .5 MG/3ML; MG/3ML
3 SOLUTION RESPIRATORY (INHALATION) EVERY 6 HOURS PRN
Status: DISCONTINUED | OUTPATIENT
Start: 2024-08-15 | End: 2024-08-15

## 2024-08-15 RX ORDER — FINASTERIDE 5 MG/1
5 TABLET, FILM COATED ORAL DAILY
Status: DISCONTINUED | OUTPATIENT
Start: 2024-08-15 | End: 2024-08-17 | Stop reason: HOSPADM

## 2024-08-15 RX ORDER — IPRATROPIUM BROMIDE AND ALBUTEROL SULFATE 2.5; .5 MG/3ML; MG/3ML
3 SOLUTION RESPIRATORY (INHALATION) EVERY 2 HOUR PRN
Status: DISCONTINUED | OUTPATIENT
Start: 2024-08-15 | End: 2024-08-17 | Stop reason: HOSPADM

## 2024-08-15 RX ORDER — ACETAMINOPHEN 325 MG/1
650 TABLET ORAL EVERY 6 HOURS PRN
Status: DISCONTINUED | OUTPATIENT
Start: 2024-08-15 | End: 2024-08-17 | Stop reason: HOSPADM

## 2024-08-15 RX ORDER — IBUPROFEN 200 MG
600 TABLET ORAL EVERY 6 HOURS PRN
COMMUNITY

## 2024-08-15 SDOH — SOCIAL STABILITY: SOCIAL INSECURITY: HAVE YOU HAD THOUGHTS OF HARMING ANYONE ELSE?: NO

## 2024-08-15 SDOH — SOCIAL STABILITY: SOCIAL INSECURITY: HAS ANYONE EVER THREATENED TO HURT YOUR FAMILY OR YOUR PETS?: NO

## 2024-08-15 SDOH — SOCIAL STABILITY: SOCIAL INSECURITY: DOES ANYONE TRY TO KEEP YOU FROM HAVING/CONTACTING OTHER FRIENDS OR DOING THINGS OUTSIDE YOUR HOME?: NO

## 2024-08-15 SDOH — SOCIAL STABILITY: SOCIAL INSECURITY: ABUSE: ADULT

## 2024-08-15 SDOH — SOCIAL STABILITY: SOCIAL INSECURITY: DO YOU FEEL ANYONE HAS EXPLOITED OR TAKEN ADVANTAGE OF YOU FINANCIALLY OR OF YOUR PERSONAL PROPERTY?: NO

## 2024-08-15 SDOH — SOCIAL STABILITY: SOCIAL INSECURITY: DO YOU FEEL UNSAFE GOING BACK TO THE PLACE WHERE YOU ARE LIVING?: NO

## 2024-08-15 SDOH — SOCIAL STABILITY: SOCIAL INSECURITY: ARE YOU OR HAVE YOU BEEN THREATENED OR ABUSED PHYSICALLY, EMOTIONALLY, OR SEXUALLY BY ANYONE?: NO

## 2024-08-15 SDOH — SOCIAL STABILITY: SOCIAL INSECURITY: WERE YOU ABLE TO COMPLETE ALL THE BEHAVIORAL HEALTH SCREENINGS?: YES

## 2024-08-15 SDOH — SOCIAL STABILITY: SOCIAL INSECURITY: ARE THERE ANY APPARENT SIGNS OF INJURIES/BEHAVIORS THAT COULD BE RELATED TO ABUSE/NEGLECT?: NO

## 2024-08-15 SDOH — SOCIAL STABILITY: SOCIAL INSECURITY: HAVE YOU HAD ANY THOUGHTS OF HARMING ANYONE ELSE?: NO

## 2024-08-15 ASSESSMENT — COGNITIVE AND FUNCTIONAL STATUS - GENERAL
MOBILITY SCORE: 24
DAILY ACTIVITIY SCORE: 24
DAILY ACTIVITIY SCORE: 24
MOBILITY SCORE: 24
PATIENT BASELINE BEDBOUND: NO
MOBILITY SCORE: 24
DAILY ACTIVITIY SCORE: 24

## 2024-08-15 ASSESSMENT — ACTIVITIES OF DAILY LIVING (ADL)
PATIENT'S MEMORY ADEQUATE TO SAFELY COMPLETE DAILY ACTIVITIES?: YES
HEARING - LEFT EAR: FUNCTIONAL
WALKS IN HOME: INDEPENDENT
TOILETING: INDEPENDENT
HEARING - RIGHT EAR: FUNCTIONAL
BATHING: INDEPENDENT
GROOMING: INDEPENDENT
JUDGMENT_ADEQUATE_SAFELY_COMPLETE_DAILY_ACTIVITIES: YES
DRESSING YOURSELF: INDEPENDENT
ADEQUATE_TO_COMPLETE_ADL: YES
FEEDING YOURSELF: INDEPENDENT
LACK_OF_TRANSPORTATION: NO

## 2024-08-15 ASSESSMENT — LIFESTYLE VARIABLES
EVER FELT BAD OR GUILTY ABOUT YOUR DRINKING: NO
HOW OFTEN DO YOU HAVE 6 OR MORE DRINKS ON ONE OCCASION: NEVER
EVER HAD A DRINK FIRST THING IN THE MORNING TO STEADY YOUR NERVES TO GET RID OF A HANGOVER: NO
TOTAL SCORE: 0
SKIP TO QUESTIONS 9-10: 1
AUDIT-C TOTAL SCORE: 0
HAVE PEOPLE ANNOYED YOU BY CRITICIZING YOUR DRINKING: NO
HOW OFTEN DO YOU HAVE A DRINK CONTAINING ALCOHOL: NEVER
HAVE YOU EVER FELT YOU SHOULD CUT DOWN ON YOUR DRINKING: NO
HOW MANY STANDARD DRINKS CONTAINING ALCOHOL DO YOU HAVE ON A TYPICAL DAY: PATIENT DOES NOT DRINK
AUDIT-C TOTAL SCORE: 0

## 2024-08-15 ASSESSMENT — PAIN - FUNCTIONAL ASSESSMENT
PAIN_FUNCTIONAL_ASSESSMENT: 0-10

## 2024-08-15 ASSESSMENT — PAIN SCALES - GENERAL
PAINLEVEL_OUTOF10: 3
PAINLEVEL_OUTOF10: 4
PAINLEVEL_OUTOF10: 0 - NO PAIN

## 2024-08-15 ASSESSMENT — PAIN DESCRIPTION - LOCATION
LOCATION: CHEST
LOCATION: CHEST

## 2024-08-15 ASSESSMENT — COLUMBIA-SUICIDE SEVERITY RATING SCALE - C-SSRS
6. HAVE YOU EVER DONE ANYTHING, STARTED TO DO ANYTHING, OR PREPARED TO DO ANYTHING TO END YOUR LIFE?: NO
2. HAVE YOU ACTUALLY HAD ANY THOUGHTS OF KILLING YOURSELF?: NO
1. IN THE PAST MONTH, HAVE YOU WISHED YOU WERE DEAD OR WISHED YOU COULD GO TO SLEEP AND NOT WAKE UP?: NO

## 2024-08-15 ASSESSMENT — PAIN DESCRIPTION - DESCRIPTORS
DESCRIPTORS: ACHING

## 2024-08-15 ASSESSMENT — PATIENT HEALTH QUESTIONNAIRE - PHQ9
2. FEELING DOWN, DEPRESSED OR HOPELESS: NOT AT ALL
SUM OF ALL RESPONSES TO PHQ9 QUESTIONS 1 & 2: 0
1. LITTLE INTEREST OR PLEASURE IN DOING THINGS: NOT AT ALL

## 2024-08-15 ASSESSMENT — PAIN DESCRIPTION - PAIN TYPE: TYPE: ACUTE PAIN

## 2024-08-15 NOTE — PROGRESS NOTES
Pharmacy Medication History Review    Monica Arriaga is a 83 y.o. female admitted for No Principal Problem: There is no principal problem currently on the Problem List. Please update the Problem List and refresh.. Pharmacy reviewed the patient's [id number] medications and allergies for accuracy.    The list below reflectives the updated PTA list. Please review each medication in order reconciliation for additional clarification and justification.  Prior to Admission Medications   Prescriptions Last Dose Informant Patient Reported? Taking?   UNABLE TO FIND  Self Yes Yes   Sig: Take 1 capsule by mouth once daily. Med Name: Ultra eye vitamin   VITAMIN B COMPLEX ORAL 8/14/2024 at am Self Yes Yes   Sig: Take 1 tablet by mouth once daily.   acetaminophen (Tylenol) 325 mg tablet 8/15/2024 at am Self Yes Yes   Sig: Take 2 tablets (650 mg) by mouth every 6 hours if needed for mild pain (1 - 3).   albuterol (Ventolin HFA) 90 mcg/actuation inhaler Unknown  No Yes   Sig: Inhale 2 puffs every 4 hours if needed for wheezing or shortness of breath.   calcium carb-vit D3-magnesium 250-200-125 mg-unit-mg capsule 8/14/2024 at am Self Yes Yes   Sig: Take by mouth.   finasteride (Proscar) 5 mg tablet 8/14/2024 at am Self Yes Yes   Sig: Take 1 tablet (5 mg) by mouth once daily.   ibuprofen 200 mg tablet 8/14/2024 at pm Self Yes Yes   Sig: Take 3 tablets (600 mg) by mouth every 6 hours if needed for mild pain (1 - 3).   lactobacillus (Culturelle) 10 billion cell capsule 8/14/2024 at am Self Yes Yes   Sig: Take by mouth.   multivitamin tablet 8/14/2024 at am Self Yes Yes   Sig: Take 1 tablet by mouth once daily.   omega-3 1,000 mg capsule capsule 8/14/2024 at am Self Yes Yes   Sig: Take 2 to 4 g of DHA and EPA as a combined total daily.   zoledronic acid (Reclast) 5 mg/100 mL piggyback Not Taking  No No   Sig: Infuse 100 mL (5 mg) into a venous catheter 1 time for 1 dose.   Patient not taking: Reported on 8/15/2024       Facility-Administered Medications: None           The list below reflectives the updated allergy list. Please review each documented allergy for additional clarification and justification.  Allergies  Reviewed by Raudel Zaragoza RN on 8/15/2024        Severity Reactions Comments    Clindamycin High GI Upset, Diarrhea, Nausea/vomiting             Below are additional concerns with the patient's PTA list.    Navya Mora CPhT

## 2024-08-15 NOTE — H&P
"HPI    HPI: Monica Arriaga is a 83 y.o. female with PMHx pericarditis 5/22, endocarditis, osteoporosis, macular degeneration, polycythemia, and subdural hematoma who presented to the hospital for chest discomfort.    Symptoms began with sore throat and an episode of vertigo Saturday 8/10/24. Sore throat symptoms resolved in 2.5 days and then her \"head began to feel full.\" Last night she got home after mingling with friends as part of her [employer] club. At midnight, \"everything started to hurt so bad\" and last night she could not sleep because of her pain. Felt like she had a \"cold in the lining of my heart.\" She also had diarrhea all day yesterday.    Today, her chest still hurts, but it's \"not bad.\" Rates it at a 3/10 today. Has had a wet cough with yellow phlegm over the past week. Endorses fevers, chills, and night sweats overnight. Chest pain is worse when coughing. Endorse nausea, diarrhea, SOB and denies vomiting and abdominal pain.     Allergies: clindamycin  Surg hx: appendectomy  Social hx:   - lives at home by herself   - retired but owns rental properties   - remote smoking history from \"my 20s\"   - drinks 2 alcoholic beverages nightly   - No illicit drug use.    ED course:     Vitals: Temp 97.9, /70, HR 96,RR 18, Spo2 95% on RA    Labs:   -CBC - WBC 12.1, RBC 5.33, Hgb 16.1, Hct47.6, MCV 89, Platelet 240, PT 12.5, INR 1.1  -CMP - Glucose 97, Na 140, K 3.5, Cl 101, HCO3 27, BUN 9, Cr 0.6, Ca 8.8, Alk Phos 160, ALT 28, AST 40  -Troponin 7  - COVID negative  -   -UA - negative  -Lipase: 6    Imaging:   - CXR: small right basilar/RML infiltrate with small pleural effusion. Trace left pleural effusion.   - CT chest: probable multifocal pneumonia, probable mild reactive upper mediastinal adenopathy    Micro:   - Blood culture: pending  - Urine culture: pending  - COVID: negative    Interventions: started on ceftriaxone and azithromycin    ROS: 12 points review of system is negative except as stated " in the HPI above.   Past Medical History     Past Medical History:   Diagnosis Date    Other intervertebral disc degeneration, lumbar region     Degeneration of intervertebral disc of lumbar region    Unilateral primary osteoarthritis, unspecified knee 10/07/2020    Arthritis of knee      Surgical History   History reviewed. No pertinent surgical history.  Family History   No family history on file.  Social History     Social History     Socioeconomic History    Marital status: Single     Spouse name: Not on file    Number of children: Not on file    Years of education: Not on file    Highest education level: Not on file   Occupational History    Not on file   Tobacco Use    Smoking status: Never    Smokeless tobacco: Never   Vaping Use    Vaping status: Never Used   Substance and Sexual Activity    Alcohol use: Yes     Alcohol/week: 7.0 - 14.0 standard drinks of alcohol     Types: 7 - 14 Standard drinks or equivalent per week    Drug use: Never    Sexual activity: Not on file   Other Topics Concern    Not on file   Social History Narrative    Not on file     Social Determinants of Health     Financial Resource Strain: Not on file   Food Insecurity: Not on file (1/9/2024)   Transportation Needs: Not on file   Physical Activity: Not on file   Stress: Not on file   Social Connections: Unknown (5/17/2024)    Received from [facility], [facility] Social Needs Screening - Social Connection     Would you like help with any of the following needs: food, medicine/medical supplies, transportation, loneliness, housing or utilities?: Not on file   Intimate Partner Violence: Not on file   Housing Stability: Not on file       Tobacco Use: Low Risk  (8/15/2024)    Patient History     Smoking Tobacco Use: Never     Smokeless Tobacco Use: Never     Passive Exposure: Not on file        Social History     Substance and Sexual Activity   Alcohol Use Yes    Alcohol/week: 7.0 - 14.0 standard drinks of alcohol     Types: 7 - 14 Standard drinks or equivalent per week      Allergies     Allergies   Allergen Reactions    Clindamycin GI Upset, Diarrhea and Nausea/vomiting      Meds    Scheduled medications  azithromycin, 500 mg, intravenous, q24h  cefTRIAXone, 2 g, intravenous, q24h      Continuous medications     PRN medications     Objective     Vitals  Visit Vitals  /80   Pulse 84   Temp 36.6 °C (97.9 °F) (Temporal)   Resp 16   Ht 1.524 m (5')   Wt 52.6 kg (116 lb)   SpO2 96%   BMI 22.65 kg/m²   Smoking Status Never   BSA 1.49 m²        Physical Examination:  Physical Exam  Constitutional:       General: She is not in acute distress.     Appearance: Normal appearance.   Cardiovascular:      Rate and Rhythm: Normal rate and regular rhythm.      Pulses: Normal pulses.      Heart sounds: Normal heart sounds. No murmur heard.  Pulmonary:      Effort: Pulmonary effort is normal. No respiratory distress.      Breath sounds: Normal breath sounds.      Comments: Coarse crackles bilaterally in the dependent portions of the lung  Abdominal:      General: There is no distension.      Palpations: Abdomen is soft.      Tenderness: There is no abdominal tenderness.   Musculoskeletal:      Right lower leg: No edema.      Left lower leg: No edema.   Skin:     General: Skin is warm and dry.   Neurological:      General: No focal deficit present.      Mental Status: She is alert and oriented to person, place, and time.   Psychiatric:         Mood and Affect: Mood normal.         Behavior: Behavior normal.       I/Os  No intake or output data in the 24 hours ending 08/15/24 1404      Labs:   Results from last 72 hours   Lab Units 08/15/24  1008   SODIUM mmol/L 140   POTASSIUM mmol/L 3.5   CHLORIDE mmol/L 101   CO2 mmol/L 27   BUN mg/dL 9   CREATININE mg/dL 0.60   GLUCOSE mg/dL 97   CALCIUM mg/dL 8.8   ANION GAP mmol/L 16   EGFR mL/min/1.73m*2 90      Results from last 72 hours   Lab Units 08/15/24  1008   WBC AUTO x10*3/uL 12.1*  "  HEMOGLOBIN g/dL 16.1*   HEMATOCRIT % 47.6*   PLATELETS AUTO x10*3/uL 240   NEUTROS PCT AUTO % 75.2   LYMPHS PCT AUTO % 9.2   MONOS PCT AUTO % 14.1   EOS PCT AUTO % 0.6      Lab Results   Component Value Date    CALCIUM 8.8 08/15/2024    PHOS 2.9 08/14/2022      Lab Results   Component Value Date    CRP 0.87 09/04/2022      Micro/ID:   No results found for the last 90 days.                   No lab exists for component: \"AGALPCRNB\"     No results found for: \"URINECULTURE\", \"BLOODCULT\", \"CSFCULTSMEAR\"  Images    CT chest wo IV contrast  Narrative: Interpreted By:  Terrance Hull,   STUDY:  CT CHEST WO IV CONTRAST;  8/15/2024 11:48 am      INDICATION:  Signs/Symptoms:hx of pericarditis, pericardial effusion, acute  nonspecific chest discomfort.      COMPARISON:  04/09/2022      ACCESSION NUMBER(S):  [id number]      ORDERING CLINICIAN:  TANK JOLLY      TECHNIQUE:  Helical data acquisition of the chest was obtained  without IV  contrast material.  Images were reformatted in axial, coronal, and  sagittal planes.      FINDINGS:  LUNGS and AIRWAYS:  There several relatively focal consolidating infiltrates, most  notably extending to the middle lobe inferiorly with several air  bronchograms, and inferomedial aspect of the lower lobes bilaterally,  most likely due to pneumonia. There is also an area of ground-glass  opacity in the right upper lobe posterolaterally, probably additional  pneumonic infiltrate. However there is also a 7 mm nodular component  likely from focal consolidation/atelectasis, but for which follow-up  would be recommended in 3-6 months as a superimposed malignant nodule  is not excluded; and to demonstrate resolution of the ground-glass  opacity and exclude lepidic malignancy. There is a trace right  pleural effusion. There is also mild compressive atelectasis at the  lung bases..      MEDIASTINUM and JOSÉ MIGUEL, LOWER NECK AND AXILLA:  The visualized thyroid gland is within normal limits.      There " several relatively small upper mediastinal nodes, which appears  slightly larger than on the prior exam, most notably with a  pretracheal node measuring 9 mm on image 78 of series 202.  This is  probably from reactive hypertrophy, but attention at follow-up  recommended. No significant hilar adenopathy given limitations of an  unenhanced scan.      HEART and VESSELS:  The thoracic aorta is of normal course and caliber without  significant atherosclerotic calcification .      Main pulmonary artery and its branches are normal in caliber.      There is mild  coronary artery atherosclerotic calcification  primarily involving the LAD.      The cardiac chambers are not enlarged.      UPPER ABDOMEN:  The visualized subdiaphragmatic structures demonstrate no remarkable  findings.      CHEST WALL, OSSEOUS STRUCTURES AND OTHER FINDINGS:  There are no suspicious osseous lesions.      Impression: 1.  Probable multifocal pneumonia as described. However, relative  short-term follow-up would be recommended for the area of  ground-glass opacity at the right upper lobe with nodular component.  2. Probable mild reactive upper mediastinal adenopathy.      Signed by: Terrance Hull 8/15/2024 1:06 PM  Dictation workstation:   [id number]  XR chest 2 views  Narrative: Interpreted By:  Cinthya Luna,   STUDY:  XR CHEST 2 VIEWS;  8/15/2024 10:35 am      INDICATION:  Signs/Symptoms:cp.      COMPARISON:  09/03/2022      ACCESSION NUMBER(S):  [id number]      ORDERING CLINICIAN:  TANK JOLLY      FINDINGS:  Artifact from overlying monitoring leads noted. Hazy right lower  chest opacity with indistinctness of the diaphragm and blunting of  the costophrenic angle. Slight blunting of the left costophrenic  angle. The cardiac silhouette is within normal limits for size.  Degenerative endplate spurring in the thoracic spine.      Impression: Small right basilar/right middle lobe infiltrate with small pleural  effusion. trace left pleural  effusion. Follow-up to ensure resolution  after appropriate treatment recommended.      MACRO:  None.      Signed by: Cinthya Luna 8/15/2024 10:45 AM  Dictation workstation:   [id number]    Assessment and Plan    Monica Arriaga is a 83 y.o. female with PMHx pericarditis 5/22, endocarditis, osteoporosis, macular degeneration, polycythemia, and subdural hematoma admitted multifocal pneumonia and started on ceftriaxone and azithromycin.     Acute Medical Issues   #Multifocal pneumonia(Community acquired vs. Atypical) vs. Pericarditis  - Blood cultures x2 drawn in the ED  - Abx in ED: ceftriaxone, azithromycin  - Sputum culture pending  - Continue ceftriaxone and azithromycin  - Duonebs q4h  - Albuterol nebs q2h PRN  - Incentive spirometry q2h while awake  - Strep pneumo, legionella pending  - TTE pending    Chronic Medical Issues   #osteoporosis: acetaminophen 325 mg , ibuprofen 200 mg    F: PO intake & IVF PRN   E: Replete as needed   N: Regular diet  GI ppx: Protonix 40 mg daily  DVT ppx: Lovenox Subq  Antibiotics: Ceftriaxone, azithromycin   Tubes/Lines/Drains: PIV    Code Status: FULL CODE   Emergency Contact: Extended Emergency Contact Information  Primary Emergency Contact: Awa Lynn           [address] [place]  Mobile Phone: [phone]  Relation: Daughter  Secondary Emergency Contact: BobbyChristian  [place] Phone: [phone]  Relation: Friend     Disposition: 83 y.o.female admitted for multifocal pneumonia. Estimated length of stay less than 48 hrs.     Donte Bain MD  PGY-1 Transitional

## 2024-08-15 NOTE — PROGRESS NOTES
08/15/24 1518   Discharge Planning   Living Arrangements Alone   Support Systems Friends/neighbors   Assistance Needed A&OX3; independent with ADLs with no DME; drives; room air baseline and currently room air   Type of Residence Private residence   Number of Stairs to Enter Residence 2   Number of Stairs Within Residence 13   Do you have animals or pets at home? No   Who is requesting discharge planning? Provider   Home or Post Acute Services None   Expected Discharge Disposition Home  (Patient very independent with ADLs and denies any home going needs)   Does the patient need discharge transport arranged? No   Financial Resource Strain   How hard is it for you to pay for the very basics like food, housing, medical care, and heating? Not hard   Housing Stability   In the last 12 months, was there a time when you were not able to pay the mortgage or rent on time? N   In the past 12 months, how many times have you moved where you were living? 1   At any time in the past 12 months, were you homeless or living in a shelter (including now)? N   Transportation Needs   In the past 12 months, has lack of transportation kept you from medical appointments or from getting medications? no   In the past 12 months, has lack of transportation kept you from meetings, work, or from getting things needed for daily living? No     08/15/2024 1520pm  Spoke with patient bedside in ED

## 2024-08-15 NOTE — ED PROVIDER NOTES
"HPI   Chief Complaint   Patient presents with    Chest Pain       CC: Chest discomfort  HPI:   83-year-old female presents ED complaining of chest discomfort that she described started underneath the left breast and radiates to the right side of her chest and also substernally, started last night was unable to sleep last night denied any associated nausea vomiting, denied any associated shortness of breath, patient states she thinks it is a \"cold around her heart\" she has had a history of pericarditis, and pericardial effusion.  She denies any fever, chills she denies having any history of coronary artery disease, denies any hemoptysis, hematemesis.  Denies any headache, dizziness or cervical neck tenderness.    Additional Limitations to History:   External Records Reviewed: I reviewed recent and relevant outside records including   History Obtained From:     Past Medical History: Per HPI  Medications: Reviewed in EMR and with patient  Allergies:  Reviewed in EMR  Past Surgical History:   Social History:     ------------------------------------------------------------------------------------------------------  Physical Exam:  --Vital signs reviewed in nursing triage note, EMR flow sheets, and at patient's bedside  GEN:  A&Ox3, no acute distress, appears comfortable.  Conversational and appropriate.  No confusion or gross mental status changes.  EYES: EOMI, non-injected sclera.  ENT: Moist mucous membranes, no apparent injuries or lesions.   CARDIO: Normal rate and regular rhythm. No murmurs, rubs, or gallops.  2+ equal pulses of the distal extremities.   PULM: Clear to auscultation bilaterally. No rales, rhonchi, or wheezes. Good symmetric chest expansion.  GI: Soft, non-tender, non-distended. No rebound tenderness or guarding.  SKIN: Warm and dry, no rashes or lesions.  MSK: ROM intact the extremities without contractures.   EXT: No peripheral edema, contusions, or wounds.   NEURO: Cranial nerves II-XII grossly " intact. Sensation to light touch intact and equal bilaterally in upper and lower extremities.  Symmetric 5/5 strength in upper and lower extremities.  PSYCH: Appropriate mood and behavior, converses and responds appropriately during exam.  -------------------------------------------------------------------------------------------------------        Differential Diagnoses Considered:   Chronic Medical Conditions Significantly Affecting Care:   Diagnostic testing considered: [PERC, D-Dimer, PECARN, etc.]    - EKG interpreted by myself normal sinus rhythm ventricular rate 93 MT interval 156 normal QRS duration no prolonged QT/QTc no obvious ST elevation, depression, T wave version or acute ischemic findings.  - I independently interpreted: [CXR, CT, POCUS, etc. including your interpretation]  - Labs notable for     Escalation of Care: Appropriate for  Social Determinants of Health Significantly Affecting Care: [Homelessness, lacking transportation, uninsured, unable to afford medications]  Prescription Drug Consideration: [Antibiotics, antivirals, pain medications, etc.]  Discussion of Management with Other Providers:  I discussed the patient/results with: [admitting team, consultant, radiologist, social work, EPAT, case management, PT/OT, RT, PCP, etc.]      Denton Toussaint PA-C              Patient History   Past Medical History:   Diagnosis Date    Other intervertebral disc degeneration, lumbar region     Degeneration of intervertebral disc of lumbar region    Unilateral primary osteoarthritis, unspecified knee 10/07/2020    Arthritis of knee     No past surgical history on file.  No family history on file.  Social History     Tobacco Use    Smoking status: Never    Smokeless tobacco: Never   Vaping Use    Vaping status: Never Used   Substance Use Topics    Alcohol use: Yes     Alcohol/week: 7.0 - 14.0 standard drinks of alcohol     Types: 7 - 14 Standard drinks or equivalent per week    Drug use: Never       Physical  Exam   ED Triage Vitals   Temp Pulse Resp BP   -- -- -- --      SpO2 Temp src Heart Rate Source Patient Position   -- -- -- --      BP Location FiO2 (%)     -- --       Physical Exam      ED Course & MDM   Diagnoses as of 08/15/24 1414   Multifocal pneumonia   Nonspecific chest pain                 No data recorded     Izabel Coma Scale Score: 15 (08/15/24 1014 : Aleksandra Campbell RN)                           Medical Decision Making  83-year-old female with nonspecific chest discomfort likely in the setting of multifocal pneumonia, patient was started on azithromycin, Rocephin, pending blood cultures, she is not oxygen dependent she is afebrile and nontoxic-appearing, given the extensive multifocal pneumonia, cough, intermittent shortness of breath and nonspecific chest pain patient will be placed in observation at this time low suspicion for acute coronary event or pulmonary emboli        Procedure  Procedures     Denton Toussaint PA-C  08/15/24 1019       Denton Toussaint PA-C  08/15/24 1415       Denton Toussaint PA-C  08/15/24 1525

## 2024-08-15 NOTE — ED TRIAGE NOTES
"Patient c/o substernal CP with SOB that started last night, patient has \"been battling a cold\" since Friday and thinks it may just be chest congestion.   "

## 2024-08-16 ENCOUNTER — PHARMACY VISIT (OUTPATIENT)
Dept: PHARMACY | Facility: CLINIC | Age: 84
End: 2024-08-16
Payer: COMMERCIAL

## 2024-08-16 PROBLEM — J18.9 MULTIFOCAL PNEUMONIA: Status: RESOLVED | Noted: 2024-08-15 | Resolved: 2024-08-16

## 2024-08-16 PROBLEM — S06.5XAA INTRACRANIAL SUBDURAL HEMATOMA (MULTI): Status: ACTIVE | Noted: 2024-08-16

## 2024-08-16 LAB
ALBUMIN SERPL BCP-MCNC: 2.9 G/DL (ref 3.4–5)
ANION GAP SERPL CALC-SCNC: 13 MMOL/L (ref 10–20)
BASOPHILS # BLD AUTO: 0.05 X10*3/UL (ref 0–0.1)
BASOPHILS NFR BLD AUTO: 0.5 %
BUN SERPL-MCNC: 7 MG/DL (ref 6–23)
CALCIUM SERPL-MCNC: 8 MG/DL (ref 8.6–10.3)
CHLORIDE SERPL-SCNC: 102 MMOL/L (ref 98–107)
CO2 SERPL-SCNC: 27 MMOL/L (ref 21–32)
CREAT SERPL-MCNC: 0.57 MG/DL (ref 0.5–1.05)
EGFRCR SERPLBLD CKD-EPI 2021: >90 ML/MIN/1.73M*2
EOSINOPHIL # BLD AUTO: 0.28 X10*3/UL (ref 0–0.4)
EOSINOPHIL NFR BLD AUTO: 3.1 %
ERYTHROCYTE [DISTWIDTH] IN BLOOD BY AUTOMATED COUNT: 14.3 % (ref 11.5–14.5)
GLUCOSE SERPL-MCNC: 86 MG/DL (ref 74–99)
HCT VFR BLD AUTO: 45.3 % (ref 36–46)
HGB BLD-MCNC: 14.9 G/DL (ref 12–16)
HOLD SPECIMEN: NORMAL
IMM GRANULOCYTES # BLD AUTO: 0.06 X10*3/UL (ref 0–0.5)
IMM GRANULOCYTES NFR BLD AUTO: 0.7 % (ref 0–0.9)
LYMPHOCYTES # BLD AUTO: 1 X10*3/UL (ref 0.8–3)
LYMPHOCYTES NFR BLD AUTO: 11 %
MAGNESIUM SERPL-MCNC: 1.96 MG/DL (ref 1.6–2.4)
MCH RBC QN AUTO: 29.4 PG (ref 26–34)
MCHC RBC AUTO-ENTMCNC: 32.9 G/DL (ref 32–36)
MCV RBC AUTO: 90 FL (ref 80–100)
MONOCYTES # BLD AUTO: 1.26 X10*3/UL (ref 0.05–0.8)
MONOCYTES NFR BLD AUTO: 13.8 %
NEUTROPHILS # BLD AUTO: 6.47 X10*3/UL (ref 1.6–5.5)
NEUTROPHILS NFR BLD AUTO: 70.9 %
NRBC BLD-RTO: 0 /100 WBCS (ref 0–0)
PHOSPHATE SERPL-MCNC: 3.2 MG/DL (ref 2.5–4.9)
PLATELET # BLD AUTO: 219 X10*3/UL (ref 150–450)
POTASSIUM SERPL-SCNC: 3.7 MMOL/L (ref 3.5–5.3)
PROCALCITONIN SERPL-MCNC: 0.12 NG/ML
RBC # BLD AUTO: 5.06 X10*6/UL (ref 4–5.2)
SODIUM SERPL-SCNC: 138 MMOL/L (ref 136–145)
WBC # BLD AUTO: 9.1 X10*3/UL (ref 4.4–11.3)

## 2024-08-16 PROCEDURE — RXMED WILLOW AMBULATORY MEDICATION CHARGE

## 2024-08-16 PROCEDURE — 85025 COMPLETE CBC W/AUTO DIFF WBC: CPT

## 2024-08-16 PROCEDURE — 83735 ASSAY OF MAGNESIUM: CPT

## 2024-08-16 PROCEDURE — 84145 PROCALCITONIN (PCT): CPT | Mod: GEALAB

## 2024-08-16 PROCEDURE — G0378 HOSPITAL OBSERVATION PER HR: HCPCS

## 2024-08-16 PROCEDURE — 2500000005 HC RX 250 GENERAL PHARMACY W/O HCPCS

## 2024-08-16 PROCEDURE — 99222 1ST HOSP IP/OBS MODERATE 55: CPT

## 2024-08-16 PROCEDURE — 80069 RENAL FUNCTION PANEL: CPT

## 2024-08-16 PROCEDURE — 2500000001 HC RX 250 WO HCPCS SELF ADMINISTERED DRUGS (ALT 637 FOR MEDICARE OP)

## 2024-08-16 PROCEDURE — 36415 COLL VENOUS BLD VENIPUNCTURE: CPT

## 2024-08-16 PROCEDURE — 96366 THER/PROPH/DIAG IV INF ADDON: CPT

## 2024-08-16 PROCEDURE — 2500000004 HC RX 250 GENERAL PHARMACY W/ HCPCS (ALT 636 FOR OP/ED)

## 2024-08-16 RX ORDER — AMOXICILLIN AND CLAVULANATE POTASSIUM 875; 125 MG/1; MG/1
1 TABLET, FILM COATED ORAL 2 TIMES DAILY
Qty: 6 TABLET | Refills: 0 | Status: SHIPPED | OUTPATIENT
Start: 2024-08-16 | End: 2024-08-17

## 2024-08-16 RX ORDER — AZITHROMYCIN 500 MG/1
500 TABLET, FILM COATED ORAL DAILY
Qty: 1 TABLET | Refills: 0 | Status: SHIPPED | OUTPATIENT
Start: 2024-08-16 | End: 2024-08-17 | Stop reason: HOSPADM

## 2024-08-16 RX ORDER — ALBUTEROL SULFATE 90 UG/1
2 INHALANT RESPIRATORY (INHALATION) EVERY 4 HOURS PRN
Qty: 8.5 G | Refills: 0 | Status: SHIPPED | OUTPATIENT
Start: 2024-08-16

## 2024-08-16 ASSESSMENT — COGNITIVE AND FUNCTIONAL STATUS - GENERAL
MOBILITY SCORE: 24
DAILY ACTIVITIY SCORE: 24
MOBILITY SCORE: 24
DAILY ACTIVITIY SCORE: 24

## 2024-08-16 ASSESSMENT — PAIN SCALES - PAIN ASSESSMENT IN ADVANCED DEMENTIA (PAINAD): TOTALSCORE: MEDICATION (SEE MAR)

## 2024-08-16 ASSESSMENT — PAIN DESCRIPTION - LOCATION
LOCATION: CHEST
LOCATION: CHEST

## 2024-08-16 ASSESSMENT — PAIN - FUNCTIONAL ASSESSMENT
PAIN_FUNCTIONAL_ASSESSMENT: 0-10
PAIN_FUNCTIONAL_ASSESSMENT: 0-10

## 2024-08-16 ASSESSMENT — PAIN DESCRIPTION - DESCRIPTORS: DESCRIPTORS: ACHING

## 2024-08-16 ASSESSMENT — PAIN SCALES - GENERAL
PAINLEVEL_OUTOF10: 3
PAINLEVEL_OUTOF10: 1
PAINLEVEL_OUTOF10: 3
PAINLEVEL_OUTOF10: 0 - NO PAIN
PAINLEVEL_OUTOF10: 0 - NO PAIN

## 2024-08-16 ASSESSMENT — PAIN DESCRIPTION - ORIENTATION: ORIENTATION: RIGHT;LEFT

## 2024-08-16 NOTE — CARE PLAN
The patient's goals for the shift include  safety, decreased pain    The clinical goals for the shift include Pt will remain free from injury    Problem: Pain - Adult  Goal: Verbalizes/displays adequate comfort level or baseline comfort level  Outcome: Progressing     Problem: Safety - Adult  Goal: Free from fall injury  Outcome: Progressing     Problem: Discharge Planning  Goal: Discharge to home or other facility with appropriate resources  Outcome: Progressing     Problem: Chronic Conditions and Co-morbidities  Goal: Patient's chronic conditions and co-morbidity symptoms are monitored and maintained or improved  Outcome: Progressing     Problem: Pain  Goal: Takes deep breaths with improved pain control throughout the shift  Outcome: Progressing  Goal: Turns in bed with improved pain control throughout the shift  Outcome: Progressing  Goal: Walks with improved pain control throughout the shift  Outcome: Progressing  Goal: Performs ADL's with improved pain control throughout shift  Outcome: Progressing  Goal: Participates in PT with improved pain control throughout the shift  Outcome: Progressing  Goal: Free from opioid side effects throughout the shift  Outcome: Progressing  Goal: Free from acute confusion related to pain meds throughout the shift  Outcome: Progressing

## 2024-08-16 NOTE — PROGRESS NOTES
08/16/24 1039   Discharge Planning   Living Arrangements Alone   Support Systems Friends/neighbors   Assistance Needed Alert and oriented x 3; Independent with ADLs,  No DME; Drives; Room air baseline and currently room air   Type of Residence Private residence   Number of Stairs to Enter Residence 2   Number of Stairs Within Residence 13   Do you have animals or pets at home? No   Home or Post Acute Services None   Expected Discharge Disposition Home   Does the patient need discharge transport arranged? No   Patient Choice   Provider Choice list and CMS website (https://medicare.gov/care-compare#search) for post-acute Quality and Resource Measure Data were provided and reviewed with: Patient;Family   Patient / Family choosing to utilize agency / facility established prior to hospitalization No

## 2024-08-16 NOTE — HOSPITAL COURSE
Monica Arriaga is a 83 y.o. female with PMHx pericarditis 5/2022, endocarditis, osteoporosis, macular degeneration, polycythemia, and subdural hematoma who presented to [facility] for chest discomfort and was admitted for multifocal pneumonia.    ED course notable for leukocytosis (12.1 WBC), Hgb 16.1, Alk Phos 160, AST 40, . Imaging significant for small right basilar/right middle lobe infiltrate with small pleural effusion and trace left pleural effusion on CXR and probably multifocal pneumonia with reactive upper mediastinal adenopathy on CT chest. Patient was started on ceftriaxone and azithromycin (8/15).    While on the floors, patient's symptoms improved and she was breathing comfortably on room air. Leukocytosis resolved (WBC 9.1) and patient's labs and vitals were unremarkable. She was cleared medically and discharged on azithromycin (last dose 8/17/24) and three day course of augmentin (8/17/24-8/19/24).     Patient advised prior to discharge to follow-up with PCP/Consultants as instructed for routine post discharge follow-up and care. At time of discharge, vital signs stable, physical exam and labs were reviewed. Discharge planning and return precautions were discussed with patient at bedside, all questions were answered to her satisfaction and she verbalized understanding. Patient discharged in stable condition to home on 8/17 with a referral to primary care.

## 2024-08-16 NOTE — DISCHARGE INSTRUCTIONS
You are now stable enough to be discharged home. You were admitted for an infectious pneumonia, which was likely bacterial. We cannot be certain, however you did improve on antibiotics, so we feel comfortable with completing a short course of oral antibiotics. Your laryngitis can be managed with warm drinks such as tea, as well as steam. Your cough can be treated with tesslon pearls and the pain with coughing with tylenol 650mg every 6 hours as needed.    The following recommendations are made for you at time of hospital discharge:  - Please take your home medications as instructed prior to hospitalization.   - The following changes to your home medications have been made during your hospital stay:  START  Amoxicillin-pot clavulanate (Augmentin) 875-125 mg tablet. Take 1 tablet by mouth 2 times a day for 3 days.  Albuterol (ProAir HFA) 90 mcg/actuation inhaler. Inhale 2 puffs every 4 hours if needed for wheezing or shortness of breath.   - Please follow-up with your primary care provider within 5-7 days from time of discharge. Likely will need to bring photo ID and insurance card to your appointment.   -  services has been requested to make an appointment for you however if you do not hear back from them within 2-3 days, please call [phone] to find out about appointments with  services.  - If you experience any worsening symptoms or any new acute concerns arise, please contact your primary care provider to discuss and possibly arrange an appointment. If you cannot get in touch with provider or severe symptoms are present, please return to nearest emergency room/urgent care for evaluation and treatment.

## 2024-08-16 NOTE — DISCHARGE SUMMARY
Discharge Diagnosis  Multifocal infectious community acquired pneumonia    Issues Requiring Follow-Up  Multifocal infectious community acquired pneumonia    Discharge Meds     Your medication list        START taking these medications        Instructions Last Dose Given Next Dose Due   amoxicillin-pot clavulanate 875-125 mg tablet  Commonly known as: Augmentin      Take 1 tablet by mouth 2 times a day for 3 days.       azithromycin 500 mg tablet  Commonly known as: Zithromax      Take 1 tablet (500 mg) by mouth once daily.              CHANGE how you take these medications        Instructions Last Dose Given Next Dose Due   albuterol 90 mcg/actuation inhaler  Commonly known as: Ventolin HFA  What changed: Another medication with the same name was added. Make sure you understand how and when to take each.      Inhale 2 puffs every 4 hours if needed for wheezing or shortness of breath.       albuterol 90 mcg/actuation inhaler  Commonly known as: ProAir HFA  What changed: You were already taking a medication with the same name, and this prescription was added. Make sure you understand how and when to take each.      Inhale 2 puffs every 4 hours if needed for wheezing or shortness of breath.              CONTINUE taking these medications        Instructions Last Dose Given Next Dose Due   acetaminophen 325 mg tablet  Commonly known as: Tylenol           calcium carb-vit D3-magnesium 250-200-125 mg-unit-mg capsule           finasteride 5 mg tablet  Commonly known as: Proscar           ibuprofen 200 mg tablet           lactobacillus 10 billion cell capsule  Commonly known as: Culturelle           multivitamin tablet           omega-3 1,000 mg capsule capsule           UNABLE TO FIND           VITAMIN B COMPLEX ORAL           zoledronic acid 5 mg/100 mL piggyback  Commonly known as: Reclast      Infuse 100 mL (5 mg) into a venous catheter 1 time for 1 dose.                 Where to Get Your Medications        These  medications were sent to [facility]  [address], [place] [address]      Hours: 9 AM to 5 PM Mon-Fri Phone: [phone]   albuterol 90 mcg/actuation inhaler  amoxicillin-pot clavulanate 875-125 mg tablet  azithromycin 500 mg tablet         Test Results Pending At Discharge  Pending Labs       Order Current Status    Legionella Antigen, Urine In process    Streptococcus pneumoniae Antigen, Urine In process    Blood Culture Preliminary result    Blood Culture Preliminary result            Hospital Course  Monica Arriaga is a 83 y.o. female with PMHx pericarditis 5/2022, endocarditis, osteoporosis, macular degeneration, polycythemia, and subdural hematoma who presented to [facility] for chest discomfort and was admitted for multifocal pneumonia.    ED course notable for leukocytosis (12.1 WBC), Hgb 16.1, Alk Phos 160, AST 40, . Imaging significant for small right basilar/right middle lobe infiltrate with small pleural effusion and trace left pleural effusion on CXR and probably multifocal pneumonia with reactive upper mediastinal adenopathy on CT chest. Patient was started on ceftriaxone and azithromycin (8/15).    While on the floors, patient's symptoms improved and she was breathing comfortably on room air. Leukocytosis resolved (WBC 9.1) and patient's labs and vitals were unremarkable. She was cleared medically and discharged on azithromycin (last dose 8/17/24) and three day course of augmentin (8/17/24-8/19/24).     Patient advised prior to discharge to follow-up with PCP/Consultants as instructed for routine post discharge follow-up and care. At time of discharge, vital signs stable, physical exam and labs were reviewed. Discharge planning and return precautions were discussed with patient at bedside, all questions were answered to her satisfaction and she verbalized understanding. Patient discharged in stable condition to home on 8/17 with a referral to primary care.       Pertinent  Physical Exam At Time of Discharge  Physical Exam  Constitutional:       Appearance: Normal appearance.   Cardiovascular:      Rate and Rhythm: Normal rate and regular rhythm.      Pulses: Normal pulses.      Heart sounds: Normal heart sounds.   Pulmonary:      Effort: Pulmonary effort is normal. No respiratory distress.      Breath sounds: Normal breath sounds.      Comments: Cough present  Abdominal:      General: There is no distension.      Palpations: Abdomen is soft.      Tenderness: There is no abdominal tenderness.   Musculoskeletal:      Right lower leg: No edema.      Left lower leg: No edema.      Comments: Pain on palpation of lower chest   Skin:     General: Skin is warm and dry.   Neurological:      General: No focal deficit present.      Mental Status: She is alert and oriented to person, place, and time.   Psychiatric:         Mood and Affect: Mood normal.         Behavior: Behavior normal.         Outpatient Follow-Up  Referral made to PCP via scheduling office    Donte De Jesus PGY1    Updated  Buck Samaniego MD

## 2024-08-16 NOTE — PROGRESS NOTES
Subjective    Overnight Events: SURY Anderson states that she feels similarly to how she did yesterday but with slight improvement. Continues to have productive cough and chest discomfort below her breasts. Had night sweats.  Denies abdominal pain, nausea/vomiting, and diarrhea.     Objective    Vitals  Visit Vitals  /73 (BP Location: Left arm, Patient Position: Lying)   Pulse 81   Temp 36.2 °C (97.2 °F) (Temporal)   Resp 16   Ht 1.524 m (5')   Wt 53.8 kg (118 lb 11.2 oz)   SpO2 95%   BMI 23.18 kg/m²   Smoking Status Never   BSA 1.51 m²       Physical Exam    Physical Exam  Constitutional:       Appearance: Normal appearance.   Cardiovascular:      Rate and Rhythm: Normal rate and regular rhythm.      Pulses: Normal pulses.      Heart sounds: Normal heart sounds.   Pulmonary:      Effort: Pulmonary effort is normal. No respiratory distress.      Breath sounds: No wheezing.      Comments: Cough present  Abdominal:      General: Bowel sounds are normal. There is no distension.      Palpations: Abdomen is soft.      Tenderness: There is no abdominal tenderness.   Musculoskeletal:      Right lower leg: No edema.      Left lower leg: No edema.      Comments: Pain on palpation to the lower chest below the breast bilaterally   Skin:     General: Skin is warm and dry.   Neurological:      General: No focal deficit present.      Mental Status: She is alert and oriented to person, place, and time.   Psychiatric:         Mood and Affect: Mood normal.         Behavior: Behavior normal.           IOs    Intake/Output Summary (Last 24 hours) at 8/16/2024 1052  Last data filed at 8/16/2024 0400  Gross per 24 hour   Intake 450 ml   Output --   Net 450 ml       Labs:   Results from last 72 hours   Lab Units 08/16/24  0637 08/15/24  1008   SODIUM mmol/L 138 140   POTASSIUM mmol/L 3.7 3.5   CHLORIDE mmol/L 102 101   CO2 mmol/L 27 27   BUN mg/dL 7 9   CREATININE mg/dL 0.57 0.60   GLUCOSE mg/dL 86 97   CALCIUM mg/dL 8.0* 8.8  "  ANION GAP mmol/L 13 16   EGFR mL/min/1.73m*2 >90 90   PHOSPHORUS mg/dL 3.2  --       Results from last 72 hours   Lab Units 08/16/24  0637 08/15/24  1008   WBC AUTO x10*3/uL 9.1 12.1*   HEMOGLOBIN g/dL 14.9 16.1*   HEMATOCRIT % 45.3 47.6*   PLATELETS AUTO x10*3/uL 219 240   NEUTROS PCT AUTO % 70.9 75.2   LYMPHS PCT AUTO % 11.0 9.2   MONOS PCT AUTO % 13.8 14.1   EOS PCT AUTO % 3.1 0.6      Lab Results   Component Value Date    CALCIUM 8.0 (L) 08/16/2024    PHOS 3.2 08/16/2024      Lab Results   Component Value Date    CRP 0.87 09/04/2022        Micro/ID:   No results found for the last 90 days.                   No lab exists for component: \"AGALPCRNB\"   Lab Results   Component Value Date    BLOODCULT Loaded on Instrument - Culture in progress 08/15/2024     Recent Labs     08/15/24  1008   [id number] Not Detected        Images  CT chest wo IV contrast    Result Date: 8/15/2024  1.  Probable multifocal pneumonia as described. However, relative short-term follow-up would be recommended for the area of ground-glass opacity at the right upper lobe with nodular component. 2. Probable mild reactive upper mediastinal adenopathy.   Signed by: Terrance Hull 8/15/2024 1:06 PM Dictation workstation:   [id number]    XR chest 2 views    Result Date: 8/15/2024  Small right basilar/right middle lobe infiltrate with small pleural effusion. trace left pleural effusion. Follow-up to ensure resolution after appropriate treatment recommended.   MACRO: None.   Signed by: Cinthya Luna 8/15/2024 10:45 AM Dictation workstation:   [id number]      Meds  Scheduled medications  azithromycin, 500 mg, intravenous, q24h  cefTRIAXone, 1 g, intravenous, q24h  enoxaparin, 40 mg, subcutaneous, q24h  finasteride, 5 mg, oral, Daily  sennosides-docusate sodium, 2 tablet, oral, BID      Continuous medications     PRN medications  PRN medications: acetaminophen, albuterol, ipratropium-albuteroL, lubricating eye drops     Assessment and Plan  "   Assessment/Plan     Monica Arriaga is a 83 y.o. female with PMHx pericarditis 5/22, endocarditis, osteoporosis, macular degeneration, polycythemia, and subdural hematoma admitted multifocal pneumonia and started on ceftriaxone and azithromycin.     Acute Medical Issues   #Multifocal pneumonia (Community acquired vs. Atypical)   - Blood cultures x2 drawn in the ED  - Abx in ED: ceftriaxone, azithromycin  - Sputum culture pending  - Continue ceftriaxone and azithromycin  - Duonebs q4h  - Albuterol nebs q2h PRN  - Incentive spirometry q2h while awake  - Strep pneumo, legionella pending  - leukocytosis resolved (WBC 9.1 on 8/16)    Chronic Medical Issues   #Osteoporosis: acetaminophen 325 mg , ibuprofen 200 mg     F: PO intake & IVF PRN   E: Replete as needed   N: Regular diet  GI ppx: Protonix 40 mg daily  DVT ppx: Lovenox Subq  Antibiotics: Ceftriaxone, azithromycin   Tubes/Lines/Drains: PIV  CODE STATUS: FULL CODE    Disposition: 83 y.o. female presenting with multifocal pneumonia. Can likely be discharged over the weekend with continued clinical improvement.    Donte Bain MD  PGY-1 Transitional

## 2024-08-17 VITALS
OXYGEN SATURATION: 96 % | RESPIRATION RATE: 16 BRPM | BODY MASS INDEX: 23.3 KG/M2 | HEIGHT: 60 IN | HEART RATE: 78 BPM | SYSTOLIC BLOOD PRESSURE: 126 MMHG | WEIGHT: 118.7 LBS | DIASTOLIC BLOOD PRESSURE: 80 MMHG | TEMPERATURE: 97.2 F

## 2024-08-17 LAB — BACTERIA UR CULT: NORMAL

## 2024-08-17 PROCEDURE — 2500000001 HC RX 250 WO HCPCS SELF ADMINISTERED DRUGS (ALT 637 FOR MEDICARE OP)

## 2024-08-17 PROCEDURE — G0378 HOSPITAL OBSERVATION PER HR: HCPCS

## 2024-08-17 PROCEDURE — 99238 HOSP IP/OBS DSCHRG MGMT 30/<: CPT

## 2024-08-17 RX ORDER — BENZONATATE 100 MG/1
100 CAPSULE ORAL 3 TIMES DAILY PRN
Status: DISCONTINUED | OUTPATIENT
Start: 2024-08-17 | End: 2024-08-17 | Stop reason: HOSPADM

## 2024-08-17 RX ORDER — AMOXICILLIN AND CLAVULANATE POTASSIUM 875; 125 MG/1; MG/1
1 TABLET, FILM COATED ORAL 2 TIMES DAILY
Start: 2024-08-17 | End: 2024-08-20

## 2024-08-17 RX ORDER — BENZONATATE 100 MG/1
100 CAPSULE ORAL 3 TIMES DAILY PRN
Qty: 20 CAPSULE | Refills: 0 | Status: SHIPPED | OUTPATIENT
Start: 2024-08-17

## 2024-08-17 ASSESSMENT — COGNITIVE AND FUNCTIONAL STATUS - GENERAL
MOBILITY SCORE: 24
DAILY ACTIVITIY SCORE: 24

## 2024-08-17 ASSESSMENT — PAIN SCALES - GENERAL
PAINLEVEL_OUTOF10: 0 - NO PAIN
PAINLEVEL_OUTOF10: 0 - NO PAIN
PAINLEVEL_OUTOF10: 5 - MODERATE PAIN

## 2024-08-17 ASSESSMENT — PAIN - FUNCTIONAL ASSESSMENT
PAIN_FUNCTIONAL_ASSESSMENT: 0-10
PAIN_FUNCTIONAL_ASSESSMENT: 0-10

## 2024-08-17 ASSESSMENT — PAIN DESCRIPTION - LOCATION: LOCATION: HEAD

## 2024-08-17 NOTE — CARE PLAN
The patient's goals for the shift include  safety, monitor resp status    The clinical goals for the shift include safety, decreased pain    Problem: Pain - Adult  Goal: Verbalizes/displays adequate comfort level or baseline comfort level  Outcome: Progressing     Problem: Safety - Adult  Goal: Free from fall injury  Outcome: Progressing     Problem: Discharge Planning  Goal: Discharge to home or other facility with appropriate resources  Outcome: Progressing     Problem: Chronic Conditions and Co-morbidities  Goal: Patient's chronic conditions and co-morbidity symptoms are monitored and maintained or improved  Outcome: Progressing     Problem: Pain  Goal: Takes deep breaths with improved pain control throughout the shift  Outcome: Progressing  Goal: Turns in bed with improved pain control throughout the shift  Outcome: Progressing  Goal: Walks with improved pain control throughout the shift  Outcome: Progressing  Goal: Performs ADL's with improved pain control throughout shift  Outcome: Progressing  Goal: Participates in PT with improved pain control throughout the shift  Outcome: Progressing  Goal: Free from opioid side effects throughout the shift  Outcome: Progressing  Goal: Free from acute confusion related to pain meds throughout the shift  Outcome: Progressing

## 2024-08-18 LAB
ATRIAL RATE: 93 BPM
BACTERIA BLD CULT: NORMAL
BACTERIA BLD CULT: NORMAL
LEGIONELLA AG UR QL: NEGATIVE
P AXIS: 47 DEGREES
P OFFSET: 197 MS
P ONSET: 148 MS
PR INTERVAL: 156 MS
Q ONSET: 226 MS
QRS COUNT: 15 BEATS
QRS DURATION: 78 MS
QT INTERVAL: 340 MS
QTC CALCULATION(BAZETT): 422 MS
QTC FREDERICIA: 393 MS
R AXIS: 58 DEGREES
S PNEUM AG UR QL: NEGATIVE
T AXIS: 24 DEGREES
T OFFSET: 396 MS
VENTRICULAR RATE: 93 BPM

## 2024-08-20 LAB
BACTERIA BLD CULT: NORMAL
BACTERIA BLD CULT: NORMAL

## 2024-08-23 ENCOUNTER — OFFICE VISIT (OUTPATIENT)
Dept: PRIMARY CARE | Facility: CLINIC | Age: 84
End: 2024-08-23
Payer: MEDICARE

## 2024-08-23 ENCOUNTER — LAB (OUTPATIENT)
Dept: LAB | Facility: LAB | Age: 84
End: 2024-08-23
Payer: MEDICARE

## 2024-08-23 VITALS
BODY MASS INDEX: 23.26 KG/M2 | WEIGHT: 115.4 LBS | OXYGEN SATURATION: 94 % | HEART RATE: 107 BPM | DIASTOLIC BLOOD PRESSURE: 64 MMHG | HEIGHT: 59 IN | SYSTOLIC BLOOD PRESSURE: 118 MMHG

## 2024-08-23 DIAGNOSIS — R73.9 HYPERGLYCEMIA: ICD-10-CM

## 2024-08-23 DIAGNOSIS — Z00.00 ROUTINE ADULT HEALTH MAINTENANCE: Primary | ICD-10-CM

## 2024-08-23 DIAGNOSIS — J18.9 MULTIFOCAL PNEUMONIA: ICD-10-CM

## 2024-08-23 DIAGNOSIS — M85.89 OSTEOPENIA OF MULTIPLE SITES: ICD-10-CM

## 2024-08-23 DIAGNOSIS — M15.9 PRIMARY OSTEOARTHRITIS INVOLVING MULTIPLE JOINTS: ICD-10-CM

## 2024-08-23 DIAGNOSIS — Z00.00 ROUTINE ADULT HEALTH MAINTENANCE: ICD-10-CM

## 2024-08-23 DIAGNOSIS — D75.1 POLYCYTHEMIA: ICD-10-CM

## 2024-08-23 DIAGNOSIS — E55.9 VITAMIN D DEFICIENCY: ICD-10-CM

## 2024-08-23 DIAGNOSIS — Z76.89 ESTABLISHING CARE WITH NEW DOCTOR, ENCOUNTER FOR: ICD-10-CM

## 2024-08-23 DIAGNOSIS — M81.0 SENILE OSTEOPOROSIS: ICD-10-CM

## 2024-08-23 DIAGNOSIS — Z13.820 SCREENING FOR OSTEOPOROSIS: ICD-10-CM

## 2024-08-23 DIAGNOSIS — Z78.0 MENOPAUSE: ICD-10-CM

## 2024-08-23 DIAGNOSIS — I31.9 PERICARDITIS, UNSPECIFIED CHRONICITY, UNSPECIFIED TYPE (HHS-HCC): ICD-10-CM

## 2024-08-23 DIAGNOSIS — R74.8 ELEVATED ALKALINE PHOSPHATASE LEVEL: ICD-10-CM

## 2024-08-23 DIAGNOSIS — H35.30 MACULAR DEGENERATION, UNSPECIFIED LATERALITY, UNSPECIFIED TYPE: ICD-10-CM

## 2024-08-23 LAB
ALBUMIN SERPL BCP-MCNC: 3.9 G/DL (ref 3.4–5)
ALP SERPL-CCNC: 175 U/L (ref 33–136)
ALT SERPL W P-5'-P-CCNC: 27 U/L (ref 7–45)
ANION GAP SERPL CALC-SCNC: 14 MMOL/L (ref 10–20)
AST SERPL W P-5'-P-CCNC: 37 U/L (ref 9–39)
BASOPHILS # BLD AUTO: 0.1 X10*3/UL (ref 0–0.1)
BASOPHILS NFR BLD AUTO: 1.4 %
BILIRUB SERPL-MCNC: 0.5 MG/DL (ref 0–1.2)
BUN SERPL-MCNC: 13 MG/DL (ref 6–23)
CALCIUM SERPL-MCNC: 9.3 MG/DL (ref 8.6–10.3)
CHLORIDE SERPL-SCNC: 97 MMOL/L (ref 98–107)
CHOLEST SERPL-MCNC: 251 MG/DL (ref 0–199)
CHOLESTEROL/HDL RATIO: 4
CO2 SERPL-SCNC: 32 MMOL/L (ref 21–32)
CREAT SERPL-MCNC: 0.63 MG/DL (ref 0.5–1.05)
EGFRCR SERPLBLD CKD-EPI 2021: 89 ML/MIN/1.73M*2
EOSINOPHIL # BLD AUTO: 0.25 X10*3/UL (ref 0–0.4)
EOSINOPHIL NFR BLD AUTO: 3.5 %
ERYTHROCYTE [DISTWIDTH] IN BLOOD BY AUTOMATED COUNT: 14.3 % (ref 11.5–14.5)
GLUCOSE SERPL-MCNC: 93 MG/DL (ref 74–99)
HCT VFR BLD AUTO: 49.6 % (ref 36–46)
HDLC SERPL-MCNC: 62.5 MG/DL
HGB BLD-MCNC: 16.5 G/DL (ref 12–16)
IMM GRANULOCYTES # BLD AUTO: 0.09 X10*3/UL (ref 0–0.5)
IMM GRANULOCYTES NFR BLD AUTO: 1.2 % (ref 0–0.9)
LYMPHOCYTES # BLD AUTO: 1.91 X10*3/UL (ref 0.8–3)
LYMPHOCYTES NFR BLD AUTO: 26.5 %
MCH RBC QN AUTO: 29.8 PG (ref 26–34)
MCHC RBC AUTO-ENTMCNC: 33.3 G/DL (ref 32–36)
MCV RBC AUTO: 90 FL (ref 80–100)
MONOCYTES # BLD AUTO: 0.72 X10*3/UL (ref 0.05–0.8)
MONOCYTES NFR BLD AUTO: 10 %
NEUTROPHILS # BLD AUTO: 4.15 X10*3/UL (ref 1.6–5.5)
NEUTROPHILS NFR BLD AUTO: 57.4 %
NON-HDL CHOLESTEROL: 189 MG/DL (ref 0–149)
NRBC BLD-RTO: 0 /100 WBCS (ref 0–0)
PLATELET # BLD AUTO: 379 X10*3/UL (ref 150–450)
POTASSIUM SERPL-SCNC: 4.6 MMOL/L (ref 3.5–5.3)
PROT SERPL-MCNC: 7 G/DL (ref 6.4–8.2)
RBC # BLD AUTO: 5.53 X10*6/UL (ref 4–5.2)
SODIUM SERPL-SCNC: 138 MMOL/L (ref 136–145)
TSH SERPL-ACNC: 1.78 MIU/L (ref 0.44–3.98)
WBC # BLD AUTO: 7.2 X10*3/UL (ref 4.4–11.3)

## 2024-08-23 PROCEDURE — 99214 OFFICE O/P EST MOD 30 MIN: CPT | Performed by: NURSE PRACTITIONER

## 2024-08-23 PROCEDURE — 36415 COLL VENOUS BLD VENIPUNCTURE: CPT

## 2024-08-23 PROCEDURE — 85025 COMPLETE CBC W/AUTO DIFF WBC: CPT

## 2024-08-23 PROCEDURE — 83036 HEMOGLOBIN GLYCOSYLATED A1C: CPT

## 2024-08-23 PROCEDURE — 1160F RVW MEDS BY RX/DR IN RCRD: CPT | Performed by: NURSE PRACTITIONER

## 2024-08-23 PROCEDURE — 82306 VITAMIN D 25 HYDROXY: CPT

## 2024-08-23 PROCEDURE — 1159F MED LIST DOCD IN RCRD: CPT | Performed by: NURSE PRACTITIONER

## 2024-08-23 ASSESSMENT — ENCOUNTER SYMPTOMS
WHEEZING: 0
COLOR CHANGE: 0
CONSTIPATION: 0
EYE PAIN: 0
WOUND: 0
SLEEP DISTURBANCE: 1
SHORTNESS OF BREATH: 0
HEADACHES: 1
FEVER: 0
ABDOMINAL DISTENTION: 0
ABDOMINAL PAIN: 0
NAUSEA: 0
DIFFICULTY URINATING: 0
ADENOPATHY: 0
JOINT SWELLING: 0
SEIZURES: 0
DIZZINESS: 0
FATIGUE: 0
BRUISES/BLEEDS EASILY: 0
WEAKNESS: 0
MYALGIAS: 0
DIARRHEA: 1
TROUBLE SWALLOWING: 0
COUGH: 0
PALPITATIONS: 0
CHILLS: 0

## 2024-08-23 NOTE — PROGRESS NOTES
Subjective   Patient ID: Monica Arriaga is a 83 y.o. female who presents for Establish Care (Pt is experiencing hair loss, does see Dr. Emilee Knox dermatologist at [facility]. Pt is taking supplements, forward Gold daily regimen, pycnogenol, probiotic essentials gold, mind and memory essentials and tricomfort gold.  Pt is pescatarian).    Patient seen today in order to establish primary care.  Patient seen sitting up in office, in no acute distress.  She is alert, oriented and appears in fair spirits.  Discussed patient's most recent hospitalization for pneumonia.  She states that she has completed her antibiotics and respiratory symptoms are improving.  No reported issues with shortness of breath, wheezing, chest pain or other acute infection concerns.  Patient is a former smoker and drinks a small amount of wine daily.  She currently lives alone but states she has a very good support system with her friends.  She is retired but appears to keep very busy in the community.  No reported issues with appetite, staying hydrated or bladder.  She reports some recent diarrhea but states that is improving.  She also reports occasional headaches but states that it they have been improving well.  No reported issues with mood but occasional insomnia reported.  Patient does not want to take any medications for insomnia at this time.  No concerns for sleep apnea?  She tries to stay very active with a goal of 6000 steps a day.  No shortness of breath or chest pain noted upon exertion.  Patient reports a history of bilateral knee arthritis and has had injections in the past.  Patient is following with a dermatologist for hair loss and remains on finasteride for this.  Patient wears glasses and is now following with a specialist for macular degeneration.  No reported dental concerns at this time.  Medications reviewed.  No other acute concerns.               Current Outpatient Medications on File Prior to Visit   Medication  Sig Dispense Refill    acetaminophen (Tylenol) 325 mg tablet Take 2 tablets (650 mg) by mouth every 6 hours if needed for mild pain (1 - 3).      finasteride (Proscar) 5 mg tablet Take 1 tablet (5 mg) by mouth once daily.      UNABLE TO FIND Take 1 capsule by mouth once daily. Med Name: Ultra eye vitamin      albuterol (ProAir HFA) 90 mcg/actuation inhaler Inhale 2 puffs every 4 hours if needed for wheezing or shortness of breath. (Patient not taking: Reported on 2024) 8.5 g 0    albuterol (Ventolin HFA) 90 mcg/actuation inhaler Inhale 2 puffs every 4 hours if needed for wheezing or shortness of breath. 8 g 1    [] amoxicillin-pot clavulanate (Augmentin) 875-125 mg tablet Take 1 tablet by mouth 2 times a day for 3 days.      benzonatate (Tessalon) 100 mg capsule Take 1 capsule (100 mg) by mouth 3 times a day as needed for cough. Do not crush or chew. (Patient not taking: Reported on 2024) 20 capsule 0    calcium carb-vit D3-magnesium 250-200-125 mg-unit-mg capsule Take by mouth.      ibuprofen 200 mg tablet Take 3 tablets (600 mg) by mouth every 6 hours if needed for mild pain (1 - 3).      lactobacillus (Culturelle) 10 billion cell capsule Take by mouth.      multivitamin tablet Take 1 tablet by mouth once daily.      omega-3 1,000 mg capsule capsule Take 2 to 4 g of DHA and EPA as a combined total daily.      VITAMIN B COMPLEX ORAL Take 1 tablet by mouth once daily.      zoledronic acid (Reclast) 5 mg/100 mL piggyback Infuse 100 mL (5 mg) into a venous catheter 1 time for 1 dose. (Patient not taking: Reported on 8/15/2024) 100 mL 0     No current facility-administered medications on file prior to visit.       Past Medical History:   Diagnosis Date    Other intervertebral disc degeneration, lumbar region     Degeneration of intervertebral disc of lumbar region    Unilateral primary osteoarthritis, unspecified knee 10/07/2020    Arthritis of knee        History reviewed. No pertinent surgical  "history.     No family history on file.     Review of Systems   Constitutional:  Negative for chills, fatigue and fever.   HENT:  Negative for dental problem and trouble swallowing.    Eyes:  Negative for pain and visual disturbance.        Wears glasses. Positive for macular degeneration   Respiratory:  Negative for cough, shortness of breath and wheezing.    Cardiovascular:  Negative for chest pain, palpitations and leg swelling.   Gastrointestinal:  Positive for diarrhea. Negative for abdominal distention, abdominal pain, constipation and nausea.        Positive for improving diarrhea   Endocrine: Negative for cold intolerance and heat intolerance.   Genitourinary:  Negative for difficulty urinating.   Musculoskeletal:  Negative for gait problem, joint swelling and myalgias.   Skin:  Negative for color change, pallor, rash and wound.   Allergic/Immunologic: Negative for environmental allergies and food allergies.   Neurological:  Positive for headaches. Negative for dizziness, seizures and weakness.        Positive for improving headaches   Hematological:  Negative for adenopathy. Does not bruise/bleed easily.   Psychiatric/Behavioral:  Positive for sleep disturbance. Negative for behavioral problems.         Positive for intermittent insomnia   All other systems reviewed and are negative.      Objective   /64   Pulse 107   Ht 1.49 m (4' 10.66\")   Wt 52.3 kg (115 lb 6.4 oz)   SpO2 94%   BMI 23.58 kg/m²     Physical Exam  Constitutional:       General: She is not in acute distress.     Appearance: Normal appearance. She is not toxic-appearing.   HENT:      Head: Normocephalic and atraumatic.      Right Ear: Tympanic membrane, ear canal and external ear normal.      Left Ear: Tympanic membrane, ear canal and external ear normal.      Nose: Nose normal.      Mouth/Throat:      Mouth: Mucous membranes are moist.      Pharynx: Oropharynx is clear.   Eyes:      Extraocular Movements: Extraocular movements " intact.      Conjunctiva/sclera: Conjunctivae normal.      Pupils: Pupils are equal, round, and reactive to light.   Cardiovascular:      Rate and Rhythm: Normal rate and regular rhythm.      Pulses: Normal pulses.      Heart sounds: Normal heart sounds. No murmur heard.  Pulmonary:      Effort: Pulmonary effort is normal.      Breath sounds: Normal breath sounds. No wheezing.   Abdominal:      General: Bowel sounds are normal.      Palpations: Abdomen is soft.   Musculoskeletal:         General: No swelling. Normal range of motion.      Cervical back: Normal range of motion and neck supple.   Skin:     General: Skin is warm and dry.   Neurological:      General: No focal deficit present.      Mental Status: She is alert and oriented to person, place, and time. Mental status is at baseline.      Cranial Nerves: No cranial nerve deficit.      Motor: No weakness.   Psychiatric:         Mood and Affect: Mood normal.         Behavior: Behavior normal.         Thought Content: Thought content normal.         Judgment: Judgment normal.         Assessment/Plan   Problem List Items Addressed This Visit             ICD-10-CM    Macular degeneration H35.30     Patient to maintain routine follow-up with ophthalmologist specialist for continued evaluation and treatment purposes         Pericarditis ([facility]) I31.9     Patient reports a history of 2 episodes of pericarditis.  Most recent hospitalization work-up was negative.  Patient to notify provider for any new concerns or symptoms associated with pericarditis         Senile osteoporosis M81.0     Patient to complete DEXA later this year.  She has previously received Reclast infusions in the past.  Will reassess need for additional treatments based upon results.         Vitamin D deficiency E55.9     Blood work ordered today for monitoring purposes         Relevant Orders    Vitamin D 25-Hydroxy,Total (for eval of Vitamin D levels) (Completed)    Multifocal pneumonia J18.9     " Patient has completed course of antibiotics and no additional respiratory concerns voiced at this time.  Recent CT scan showed: \"IMPRESSION: 1.  Probable multifocal pneumonia as described. However, relative short-term follow-up would be recommended for the area of ground-glass opacity at the right upper lobe with nodular component. 2. Probable mild reactive upper mediastinal adenopathy\".  Consider follow-up CT/ CXR if no improvement or worsening of symptoms?         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  Patient should notify provider for any persistent or worsening joint/pain concerns         Routine adult health maintenance - Primary Z00.00     Routine blood work ordered today for monitoring purposes  -Given age, no indication for screening colonoscopy or mammogram at this time  -DEXA ordered to be completed later this year         Relevant Orders    CBC and Auto Differential (Completed)    Comprehensive Metabolic Panel    Vitamin D 25-Hydroxy,Total (for eval of Vitamin D levels) (Completed)    Hemoglobin A1C (Completed)    Lipid Panel Non-Fasting (Completed)    TSH with reflex to Free T4 if abnormal (Completed)     Other Visit Diagnoses         Codes    Hyperglycemia     R73.9    Relevant Orders    Hemoglobin A1C (Completed)    Menopause     Z78.0    Relevant Orders    XR DEXA bone density    Screening for osteoporosis     Z13.820    Relevant Orders    XR DEXA bone density               "

## 2024-08-23 NOTE — ASSESSMENT & PLAN NOTE
"Patient has completed course of antibiotics and no additional respiratory concerns voiced at this time.  Recent CT scan showed: \"IMPRESSION: 1.  Probable multifocal pneumonia as described. However, relative short-term follow-up would be recommended for the area of ground-glass opacity at the right upper lobe with nodular component. 2. Probable mild reactive upper mediastinal adenopathy\".  Consider follow-up CT/ CXR if no improvement or worsening of symptoms?  "

## 2024-08-24 LAB
25(OH)D3 SERPL-MCNC: 70 NG/ML (ref 30–100)
EST. AVERAGE GLUCOSE BLD GHB EST-MCNC: 100 MG/DL
HBA1C MFR BLD: 5.1 %

## 2024-08-26 ENCOUNTER — TELEPHONE (OUTPATIENT)
Dept: PRIMARY CARE | Facility: CLINIC | Age: 84
End: 2024-08-26
Payer: MEDICARE

## 2024-08-26 PROBLEM — R09.89 PHLEGM IN THROAT: Status: RESOLVED | Noted: 2024-05-28 | Resolved: 2024-08-26

## 2024-08-26 PROBLEM — M15.9 PRIMARY OSTEOARTHRITIS INVOLVING MULTIPLE JOINTS: Status: ACTIVE | Noted: 2024-08-26

## 2024-08-26 PROBLEM — Z00.00 ROUTINE ADULT HEALTH MAINTENANCE: Status: ACTIVE | Noted: 2024-08-26

## 2024-08-26 PROBLEM — M15.0 PRIMARY OSTEOARTHRITIS INVOLVING MULTIPLE JOINTS: Status: ACTIVE | Noted: 2024-08-26
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
## 2024-08-26 NOTE — ASSESSMENT & PLAN NOTE
Patient to complete DEXA later this year.  She has previously received Reclast infusions in the past.  Will reassess need for additional treatments based upon results.

## 2024-08-26 NOTE — ASSESSMENT & PLAN NOTE
Patient to maintain routine follow-up with ophthalmologist specialist for continued evaluation and treatment purposes

## 2024-08-26 NOTE — TELEPHONE ENCOUNTER
----- Message from Berenice Lopez sent at 8/26/2024  9:56 AM EDT -----  Can you please update patient regarding most recent blood work results?  -Blood counts reviewed and showed a mild elevation.  Please encourage patient to intake more fluids and have blood work rechecked in approximately 1 month.  If elevation persists, will recommend repeat follow-up with hematology for additional assessment purposes.  Patient has been followed in the past for polycythemia  -Your cholesterol levels were elevated. Please follow a low fat, low cholesterol diet and increase your activity levels as able. Orders are in place to recheck your cholesterol levels in 3 months. Please make sure you are fasting for at least 8 hours prior to this repeat blood draw. If elevations persist, we will discuss adding on medications to assist with lowering your cholesterol levels.   -One of your liver enzymes showed elevation.  Please minimize alcohol and Tylenol use.  Orders are in place to have this level rechecked in 1 month.  If it persists, we will obtain an ultrasound for additional assessment purposes.  -Vitamin D, kidney, thyroid and blood glucose levels were normal

## 2024-08-26 NOTE — ASSESSMENT & PLAN NOTE
Routine blood work ordered today for monitoring purposes  -Given age, no indication for screening colonoscopy or mammogram at this time  -DEXA ordered to be completed later this year

## 2024-08-26 NOTE — ASSESSMENT & PLAN NOTE
Patient reports a history of 2 episodes of pericarditis.  Most recent hospitalization work-up was negative.  Patient to notify provider for any new concerns or symptoms associated with pericarditis

## 2024-08-26 NOTE — TELEPHONE ENCOUNTER
Result Communication    Resulted Orders   Comprehensive Metabolic Panel   Result Value Ref Range    Glucose 93 74 - 99 mg/dL    Sodium 138 136 - 145 mmol/L    Potassium 4.6 3.5 - 5.3 mmol/L    Chloride 97 (L) 98 - 107 mmol/L    Bicarbonate 32 21 - 32 mmol/L    Anion Gap 14 10 - 20 mmol/L    Urea Nitrogen 13 6 - 23 mg/dL    Creatinine 0.63 0.50 - 1.05 mg/dL    eGFR 89 >60 mL/min/1.73m*2      Comment:      Calculations of estimated GFR are performed using the 2021 CKD-EPI Study Refit equation without the race variable for the IDMS-Traceable creatinine methods.  https://jasn.asnjournals.org/content/early/2021/09/22/ASN.8018418063    Calcium 9.3 8.6 - 10.3 mg/dL    Albumin 3.9 3.4 - 5.0 g/dL    Alkaline Phosphatase 175 (H) 33 - 136 U/L    Total Protein 7.0 6.4 - 8.2 g/dL    AST 37 9 - 39 U/L    Bilirubin, Total 0.5 0.0 - 1.2 mg/dL    ALT 27 7 - 45 U/L      Comment:      Patients treated with Sulfasalazine may generate falsely decreased results for ALT.   CBC and Auto Differential   Result Value Ref Range    WBC 7.2 4.4 - 11.3 x10*3/uL    nRBC 0.0 0.0 - 0.0 /100 WBCs    RBC 5.53 (H) 4.00 - 5.20 x10*6/uL    Hemoglobin 16.5 (H) 12.0 - 16.0 g/dL    Hematocrit 49.6 (H) 36.0 - 46.0 %    MCV 90 80 - 100 fL    MCH 29.8 26.0 - 34.0 pg    MCHC 33.3 32.0 - 36.0 g/dL    RDW 14.3 11.5 - 14.5 %    Platelets 379 150 - 450 x10*3/uL    Neutrophils % 57.4 40.0 - 80.0 %    Immature Granulocytes %, Automated 1.2 (H) 0.0 - 0.9 %      Comment:      Immature Granulocyte Count (IG) includes promyelocytes, myelocytes and metamyelocytes but does not include bands. Percent differential counts (%) should be interpreted in the context of the absolute cell counts (cells/UL).    Lymphocytes % 26.5 13.0 - 44.0 %    Monocytes % 10.0 2.0 - 10.0 %    Eosinophils % 3.5 0.0 - 6.0 %    Basophils % 1.4 0.0 - 2.0 %    Neutrophils Absolute 4.15 1.60 - 5.50 x10*3/uL      Comment:      Percent differential counts (%) should be interpreted in the context of the  absolute cell counts (cells/uL).    Immature Granulocytes Absolute, Automated 0.09 0.00 - 0.50 x10*3/uL    Lymphocytes Absolute 1.91 0.80 - 3.00 x10*3/uL    Monocytes Absolute 0.72 0.05 - 0.80 x10*3/uL    Eosinophils Absolute 0.25 0.00 - 0.40 x10*3/uL    Basophils Absolute 0.10 0.00 - 0.10 x10*3/uL   Vitamin D 25-Hydroxy,Total (for eval of Vitamin D levels)   Result Value Ref Range    Vitamin D, 25-Hydroxy, Total 70 30 - 100 ng/mL    Narrative    Deficiency:         < 20   ng/ml  Insufficiency:      20-29  ng/ml  Sufficiency:         ng/ml  This assay accurately quantifies the sum of Vitamin D3, 25-Hydroxy and Vitamin D2,25-Hydroxy.   Hemoglobin A1C   Result Value Ref Range    Hemoglobin A1C 5.1 see below %    Estimated Average Glucose 100 Not Established mg/dL    Narrative    Diagnosis of Diabetes-Adults  Non-Diabetic: < or = 5.6%  Increased risk for developing diabetes: 5.7-6.4%  Diagnostic of diabetes: > or = 6.5%       Lipid Panel Non-Fasting   Result Value Ref Range    Cholesterol 251 (H) 0 - 199 mg/dL      Comment:            Age      Desirable   Borderline High   High     0-19 Y     0 - 169       170 - 199     >/= 200    20-24 Y     0 - 189       190 - 224     >/= 225         >24 Y     0 - 199       200 - 239     >/= 240   **All ranges are based on fasting samples. Specific   therapeutic targets will vary based on patient-specific   cardiac risk.    Pediatric guidelines reference:Pediatrics 2011, 128(S5).Adult guidelines reference: NCEP ATPIII Guidelines,PAZ 2001, 258:2486-97    Venipuncture immediately after or during the administration of Metamizole may lead to falsely low results. Testing should be performed immediately prior to Metamizole dosing.    HDL-Cholesterol 62.5 mg/dL      Comment:        Age       Very Low   Low     Normal    High    0-19 Y    < 35      < 40     40-45     ----  20-24 Y    ----     < 40      >45      ----        >24 Y      ----     < 40     40-60      >60       Cholesterol/HDL Ratio 4.0       Comment:        Ref Values  Desirable  < 3.4  High Risk  > 5.0    Non-HDL Cholesterol 189 (H) 0 - 149 mg/dL      Comment:         Age        Desiable   Borderline High   High     Very High   0-19 Y     0 - 119       120 - 144     >/= 145    >/= 160  20-24 Y     0 - 149       150 - 189     >/= 190      ----       >24 Y    30 MG/DL ABOVE LDL CHOLESTEROL GOAL   TSH with reflex to Free T4 if abnormal   Result Value Ref Range    Thyroid Stimulating Hormone 1.78 0.44 - 3.98 mIU/L    Narrative    TSH testing is performed using different testing methodology at [facility] than at other [facility]. Direct result comparisons should only be made within the same method.         4:15 PM      Results were successfully communicated with the patient and they acknowledged their understanding.

## 2024-09-24 ENCOUNTER — LAB (OUTPATIENT)
Dept: LAB | Facility: LAB | Age: 84
End: 2024-09-24
Payer: MEDICARE

## 2024-09-24 DIAGNOSIS — D75.1 POLYCYTHEMIA: ICD-10-CM

## 2024-09-24 DIAGNOSIS — R74.8 ELEVATED ALKALINE PHOSPHATASE LEVEL: ICD-10-CM

## 2024-09-24 LAB
ALBUMIN SERPL BCP-MCNC: 4.2 G/DL (ref 3.4–5)
ALP SERPL-CCNC: 99 U/L (ref 33–136)
ALT SERPL W P-5'-P-CCNC: 18 U/L (ref 7–45)
ANION GAP SERPL CALC-SCNC: 14 MMOL/L (ref 10–20)
AST SERPL W P-5'-P-CCNC: 28 U/L (ref 9–39)
BASOPHILS # BLD AUTO: 0.06 X10*3/UL (ref 0–0.1)
BASOPHILS NFR BLD AUTO: 1.2 %
BILIRUB SERPL-MCNC: 0.9 MG/DL (ref 0–1.2)
BUN SERPL-MCNC: 9 MG/DL (ref 6–23)
CALCIUM SERPL-MCNC: 9.5 MG/DL (ref 8.6–10.3)
CHLORIDE SERPL-SCNC: 100 MMOL/L (ref 98–107)
CO2 SERPL-SCNC: 29 MMOL/L (ref 21–32)
CREAT SERPL-MCNC: 0.6 MG/DL (ref 0.5–1.05)
EGFRCR SERPLBLD CKD-EPI 2021: 90 ML/MIN/1.73M*2
EOSINOPHIL # BLD AUTO: 0.18 X10*3/UL (ref 0–0.4)
EOSINOPHIL NFR BLD AUTO: 3.6 %
ERYTHROCYTE [DISTWIDTH] IN BLOOD BY AUTOMATED COUNT: 15.9 % (ref 11.5–14.5)
GLUCOSE SERPL-MCNC: 80 MG/DL (ref 74–99)
HCT VFR BLD AUTO: 50.4 % (ref 36–46)
HGB BLD-MCNC: 16.1 G/DL (ref 12–16)
IMM GRANULOCYTES # BLD AUTO: 0.01 X10*3/UL (ref 0–0.5)
IMM GRANULOCYTES NFR BLD AUTO: 0.2 % (ref 0–0.9)
LYMPHOCYTES # BLD AUTO: 1.51 X10*3/UL (ref 0.8–3)
LYMPHOCYTES NFR BLD AUTO: 30.3 %
MCH RBC QN AUTO: 29.4 PG (ref 26–34)
MCHC RBC AUTO-ENTMCNC: 31.9 G/DL (ref 32–36)
MCV RBC AUTO: 92 FL (ref 80–100)
MONOCYTES # BLD AUTO: 0.52 X10*3/UL (ref 0.05–0.8)
MONOCYTES NFR BLD AUTO: 10.4 %
NEUTROPHILS # BLD AUTO: 2.71 X10*3/UL (ref 1.6–5.5)
NEUTROPHILS NFR BLD AUTO: 54.3 %
NRBC BLD-RTO: 0 /100 WBCS (ref 0–0)
PLATELET # BLD AUTO: 242 X10*3/UL (ref 150–450)
POTASSIUM SERPL-SCNC: 4.2 MMOL/L (ref 3.5–5.3)
PROT SERPL-MCNC: 6.6 G/DL (ref 6.4–8.2)
RBC # BLD AUTO: 5.47 X10*6/UL (ref 4–5.2)
SODIUM SERPL-SCNC: 139 MMOL/L (ref 136–145)
WBC # BLD AUTO: 5 X10*3/UL (ref 4.4–11.3)

## 2024-09-24 PROCEDURE — 85025 COMPLETE CBC W/AUTO DIFF WBC: CPT

## 2024-09-24 PROCEDURE — 36415 COLL VENOUS BLD VENIPUNCTURE: CPT

## 2024-09-24 PROCEDURE — 80053 COMPREHEN METABOLIC PANEL: CPT

## 2024-11-04 ENCOUNTER — TELEPHONE (OUTPATIENT)
Dept: PRIMARY CARE | Facility: CLINIC | Age: 84
End: 2024-11-04

## 2024-11-04 ENCOUNTER — HOSPITAL ENCOUNTER (OUTPATIENT)
Dept: RADIOLOGY | Facility: HOSPITAL | Age: 84
Discharge: HOME | End: 2024-11-04
Payer: MEDICARE

## 2024-11-04 DIAGNOSIS — M81.0 SENILE OSTEOPOROSIS: Primary | ICD-10-CM

## 2024-11-04 DIAGNOSIS — Z78.0 MENOPAUSE: ICD-10-CM

## 2024-11-04 DIAGNOSIS — Z13.820 SCREENING FOR OSTEOPOROSIS: ICD-10-CM

## 2024-11-04 PROCEDURE — 77080 DXA BONE DENSITY AXIAL: CPT

## 2024-11-04 PROCEDURE — 77080 DXA BONE DENSITY AXIAL: CPT | Performed by: RADIOLOGY

## 2024-11-04 NOTE — TELEPHONE ENCOUNTER
PT had her Dexa Scan today, pt is asking if JM can set up an appt. For reclast? Pt leaves for [place] on 11/19/2024 and would like to have reclast appt. before she leaves. Please advise pt [phone]

## 2024-11-04 NOTE — TELEPHONE ENCOUNTER
DEXA results are still pending.  Unfortunately the providers at this office are not credentialed at the local infusion center so we cannot do the Reclast infusions but can give Prolia injections through our pharmacy team.  I placed a consult for our pharmacist, Renay, to contact the patient to go over this option.

## 2024-11-06 NOTE — TELEPHONE ENCOUNTER
Pt informed Pharmacists team would be calling her to discus her treatment.  Pt was receiving reclast infusion from her past PCP .   Pt informed we will call when her dexa scan is rcvd and signed off by NP.

## 2024-11-07 ENCOUNTER — TELEMEDICINE (OUTPATIENT)
Dept: PHARMACY | Facility: HOSPITAL | Age: 84
End: 2024-11-07
Payer: MEDICARE

## 2024-11-07 DIAGNOSIS — M85.89 OSTEOPENIA OF MULTIPLE SITES: Primary | ICD-10-CM

## 2024-11-07 DIAGNOSIS — M81.0 SENILE OSTEOPOROSIS: ICD-10-CM

## 2024-11-07 RX ORDER — HEPARIN SODIUM 1000 [USP'U]/ML
2000 INJECTION, SOLUTION INTRAVENOUS; SUBCUTANEOUS AS NEEDED
OUTPATIENT
Start: 2024-11-07

## 2024-11-07 RX ORDER — HEPARIN SODIUM,PORCINE/PF 10 UNIT/ML
50 SYRINGE (ML) INTRAVENOUS AS NEEDED
OUTPATIENT
Start: 2024-11-07

## 2024-11-07 RX ORDER — ZOLEDRONIC ACID 5 MG/100ML
5 INJECTION, SOLUTION INTRAVENOUS ONCE
OUTPATIENT
Start: 2024-11-11

## 2024-11-07 RX ORDER — ZOLEDRONIC ACID 5 MG/100ML
5 INJECTION, SOLUTION INTRAVENOUS
Qty: 100 ML | Refills: 0 | OUTPATIENT
Start: 2024-11-07

## 2024-11-07 RX ORDER — HEPARIN 100 UNIT/ML
500 SYRINGE INTRAVENOUS AS NEEDED
OUTPATIENT
Start: 2024-11-07

## 2024-11-07 NOTE — Clinical Note
Patient was a little difficult to work with, adamant that she only wanted to continue receiving Reclast and would not pursue any other medication options.   Long story short, within the last 2 weeks, I learned that PCPs *can* send infusions prescriptions, as long as it is to either [facility] infusion center, or to the infusion center in [facility]. I'll be sending out an email on it soon once I hear back from our infusion specialist to create a complete guide.   For now, Reclast has been sent in via the treatment plan to  [facility] infusion at [facility] infusion [place]. Please let me know right away if you have any different preferences! Everything should be in place for her to receive infusion and get scheduled. She must receive before November 19th.

## 2024-11-08 ENCOUNTER — TELEPHONE (OUTPATIENT)
Dept: PHARMACY | Facility: HOSPITAL | Age: 84
End: 2024-11-08

## 2024-11-08 ENCOUNTER — LAB (OUTPATIENT)
Dept: LAB | Facility: LAB | Age: 84
End: 2024-11-08
Payer: MEDICARE

## 2024-11-08 ENCOUNTER — DOCUMENTATION (OUTPATIENT)
Dept: INFUSION THERAPY | Facility: CLINIC | Age: 84
End: 2024-11-08
Payer: MEDICARE

## 2024-11-08 DIAGNOSIS — Z00.00 ROUTINE ADULT HEALTH MAINTENANCE: ICD-10-CM

## 2024-11-08 DIAGNOSIS — M85.89 OSTEOPENIA OF MULTIPLE SITES: ICD-10-CM

## 2024-11-08 DIAGNOSIS — M85.89 OSTEOPENIA OF MULTIPLE SITES: Primary | ICD-10-CM

## 2024-11-08 LAB
ALBUMIN SERPL BCP-MCNC: 4 G/DL (ref 3.4–5)
ALP SERPL-CCNC: 95 U/L (ref 33–136)
ALT SERPL W P-5'-P-CCNC: 18 U/L (ref 7–45)
ANION GAP SERPL CALC-SCNC: 14 MMOL/L (ref 10–20)
AST SERPL W P-5'-P-CCNC: 28 U/L (ref 9–39)
BILIRUB SERPL-MCNC: 0.7 MG/DL (ref 0–1.2)
BUN SERPL-MCNC: 11 MG/DL (ref 6–23)
CALCIUM SERPL-MCNC: 9.3 MG/DL (ref 8.6–10.3)
CHLORIDE SERPL-SCNC: 101 MMOL/L (ref 98–107)
CHOLEST SERPL-MCNC: 239 MG/DL (ref 0–199)
CHOLESTEROL/HDL RATIO: 2.4
CO2 SERPL-SCNC: 29 MMOL/L (ref 21–32)
CREAT SERPL-MCNC: 0.66 MG/DL (ref 0.5–1.05)
EGFRCR SERPLBLD CKD-EPI 2021: 88 ML/MIN/1.73M*2
GLUCOSE SERPL-MCNC: 73 MG/DL (ref 74–99)
HDLC SERPL-MCNC: 98.2 MG/DL
LDLC SERPL CALC-MCNC: 118 MG/DL
NON HDL CHOLESTEROL: 141 MG/DL (ref 0–149)
POTASSIUM SERPL-SCNC: 4.1 MMOL/L (ref 3.5–5.3)
PROT SERPL-MCNC: 6.3 G/DL (ref 6.4–8.2)
SODIUM SERPL-SCNC: 140 MMOL/L (ref 136–145)
TRIGL SERPL-MCNC: 116 MG/DL (ref 0–149)
VLDL: 23 MG/DL (ref 0–40)

## 2024-11-08 PROCEDURE — 80053 COMPREHEN METABOLIC PANEL: CPT

## 2024-11-08 PROCEDURE — 36415 COLL VENOUS BLD VENIPUNCTURE: CPT

## 2024-11-08 PROCEDURE — 82306 VITAMIN D 25 HYDROXY: CPT

## 2024-11-08 PROCEDURE — 80061 LIPID PANEL: CPT

## 2024-11-08 NOTE — TELEPHONE ENCOUNTER
Received Epic Chat from [facility] Infusion/Pharmacy team-     For Reclast injection, patient needs updated labs (although recent, labs are over 28 days old).     Patient contacted and confirmed- will obtain labs at [facility] outpatient labs today 11/8/24.    Labs in process as of 15:31, Infusion team informed via chat.

## 2024-11-08 NOTE — PROGRESS NOTES
CLINICAL CLEARANCE FOR OUTPATIENT INFUSION      Patient to be scheduled for  Continuation of Reclast infusions  For Diagnosis: Osteoporosis     Labs required prior to treatment (place lab orders if not already ordered or completed):  Lab Results   Component Value Date    CREATININE 0.60 09/24/2024    There were no vitals filed for this visit.  CrCl:  59.48 (hold / contact prescribing provider if <35ml/min)     Serum Calcium: 9.5 (Hold/ contact prescribing provider if <8.6 mg/dL)  Lab Results   Component Value Date    CALCIUM 9.5 09/24/2024    PHOS 3.2 08/16/2024      Lab Results   Component Value Date    ALBUMIN 4.2 09/24/2024        Calcium and Vitamin D supplement noted on medication list? Yes  (if no nurse to encourage discussion of supplementation at visit)  Current Outpatient Medications on File Prior to Visit   Medication Sig Dispense Refill    acetaminophen (Tylenol) 325 mg tablet Take 2 tablets (650 mg) by mouth every 6 hours if needed for mild pain (1 - 3).      albuterol (Ventolin HFA) 90 mcg/actuation inhaler Inhale 2 puffs every 4 hours if needed for wheezing or shortness of breath. 8 g 1    calcium carb-vit D3-magnesium 250-200-125 mg-unit-mg capsule Take by mouth.      finasteride (Proscar) 5 mg tablet Take 1 tablet (5 mg) by mouth once daily.      ibuprofen 200 mg tablet Take 3 tablets (600 mg) by mouth every 6 hours if needed for mild pain (1 - 3).      lactobacillus (Culturelle) 10 billion cell capsule Take by mouth.      multivitamin tablet Take 1 tablet by mouth once daily.      omega-3 1,000 mg capsule capsule Take 2 to 4 g of DHA and EPA as a combined total daily.      UNABLE TO FIND Take 1 capsule by mouth once daily. Med Name: Ultra eye vitamin      VITAMIN B COMPLEX ORAL Take 1 tablet by mouth once daily.      zoledronic acid (Reclast) 5 mg/100 mL piggyback Infuse 100 mL (5 mg) at 400 mL/hr over 15 minutes into a venous catheter yearly. 100 mL 0     No current facility-administered  medications on file prior to visit.        Is the patient receiving or have an allergy to Zometa? No  Allergies   Allergen Reactions    Clindamycin GI Upset, Diarrhea and Nausea/vomiting        No obvious recent dental work per chart review. Nurse to confirm no dental within past/next 4 weeks at encounter.    Urine Hcg test ordered prior to first infusion? No (If female pt <60 years of age and with reproductive capability)  (If not and is indicated please order)    Last infusion received: 11/15/2023 (if continuation)    Due: 11/11/2024    Labs drawn no more than 28 days prior to their scheduled appointment    Okay to schedule for treatment as ordered by prescribing provider pending labs. Please inform patient of need to have labs drawn prior to scheduled treatment. Active order for labs in chart.

## 2024-11-09 LAB — 25(OH)D3 SERPL-MCNC: 65 NG/ML (ref 30–100)

## 2024-11-11 ENCOUNTER — APPOINTMENT (OUTPATIENT)
Dept: INFUSION THERAPY | Facility: CLINIC | Age: 84
End: 2024-11-11
Payer: MEDICARE

## 2024-11-11 VITALS
TEMPERATURE: 97 F | BODY MASS INDEX: 24.31 KG/M2 | HEART RATE: 76 BPM | RESPIRATION RATE: 18 BRPM | SYSTOLIC BLOOD PRESSURE: 106 MMHG | WEIGHT: 119 LBS | OXYGEN SATURATION: 97 % | DIASTOLIC BLOOD PRESSURE: 67 MMHG

## 2024-11-11 DIAGNOSIS — M85.89 OSTEOPENIA OF MULTIPLE SITES: ICD-10-CM

## 2024-11-11 PROCEDURE — 96374 THER/PROPH/DIAG INJ IV PUSH: CPT | Performed by: NURSE PRACTITIONER

## 2024-11-11 RX ORDER — ZOLEDRONIC ACID 5 MG/100ML
5 INJECTION, SOLUTION INTRAVENOUS ONCE
Status: COMPLETED | OUTPATIENT
Start: 2024-11-11 | End: 2024-11-11

## 2024-11-11 RX ORDER — ZOLEDRONIC ACID 5 MG/100ML
5 INJECTION, SOLUTION INTRAVENOUS ONCE
Status: CANCELLED | OUTPATIENT
Start: 2024-11-11

## 2024-11-11 ASSESSMENT — ENCOUNTER SYMPTOMS
PALPITATIONS: 0
SHORTNESS OF BREATH: 0
COUGH: 0
ABDOMINAL PAIN: 0
FATIGUE: 0
HEADACHES: 0
NUMBNESS: 0
BLOOD IN STOOL: 0
CHILLS: 0
EXTREMITY WEAKNESS: 0
MYALGIAS: 0
VOMITING: 0
NAUSEA: 0
DYSURIA: 0
LIGHT-HEADEDNESS: 0
FREQUENCY: 0
SORE THROAT: 0
DIARRHEA: 0
ARTHRALGIAS: 0
FEVER: 0
TROUBLE SWALLOWING: 0
DIZZINESS: 0
APPETITE CHANGE: 0
WOUND: 0
CONSTIPATION: 0
LEG SWELLING: 0
UNEXPECTED WEIGHT CHANGE: 0
VOICE CHANGE: 0
WHEEZING: 0
HEMATURIA: 0
EYE PROBLEMS: 0

## 2024-11-11 ASSESSMENT — PAIN SCALES - GENERAL: PAINLEVEL_OUTOF10: 0-NO PAIN

## 2024-11-11 NOTE — PROGRESS NOTES
[facility]   Infusion Clinic Note   Date: 2024   Name: Monica Arriaga  : 1940   MRN: [id number]          Reason for Visit: OP Infusion (Reclast)         Today: We administered zoledronic acid.       Visit Type: INFUSION       Ordered By: JOSÉ MIGUEL Lopez NP       Accompanied by:Self       Diagnosis: Osteopenia of multiple sites        Allergies:   Allergies as of 2024 - Reviewed 2024   Allergen Reaction Noted    Clindamycin GI Upset, Diarrhea, and Nausea/vomiting 10/09/2009          Current Medications has a current medication list which includes the following prescription(s): acetaminophen, albuterol, calcium carb-vit d3-magnesium, finasteride, ibuprofen, lactobacillus, multivitamin, omega-3, UNABLE TO FIND, vitamin b complex, and zoledronic acid.       Vitals:   Vitals:    24 0948 24 1025   BP: 124/79 106/67   Pulse: 80 76   Resp: 18 18   Temp: 36.2 °C (97.1 °F) 36.1 °C (97 °F)   SpO2: 99% 97%   Weight: 54 kg (119 lb)    PainSc: 0-No pain              Infusion Pre-procedure Checklist:   - Allergies reviewed: yes   - Medications reviewed: yes       - Previous reaction to current treatment: no      Assess patient for the concerns below. Document provider notification as appropriate.  - Active or recent infection with/without current antibiotic use: no  - Recent or planned invasive dental work: no  - Recent or planned surgeries: no  - Recently received or plans to receive vaccinations: no  - Has treatment related toxicities: no  - Is pregnant:  n/a      Pain: 0   - Is the pain different from normal: no   - Is your Doctor aware:  n/a       Labs: N/A          Fall Risk Screening: Malhotra Fall Risk  History of Falling, Immediate or Within 3 Months: No  Secondary Diagnosis: Yes  Ambulatory Aid: Walks without aid/bedrest/nurse assist  Intravenous Therapy/Heparin Lock: Yes  Gait/Transferring: Normal/bedrest/immobile  Mental Status: Oriented to own ability  Malhotra Fall  "Risk Score: 35       Review Of Systems:  Review of Systems   Constitutional:  Negative for appetite change, chills, fatigue, fever and unexpected weight change.   HENT:   Negative for hearing loss, mouth sores, sore throat, tinnitus, trouble swallowing and voice change.    Eyes:  Negative for eye problems.   Respiratory:  Negative for cough, shortness of breath and wheezing.    Cardiovascular:  Negative for chest pain, leg swelling and palpitations.   Gastrointestinal:  Negative for abdominal pain, blood in stool, constipation, diarrhea, nausea and vomiting.   Genitourinary:  Negative for dysuria, frequency and hematuria.    Musculoskeletal:  Negative for arthralgias and myalgias.   Skin:  Negative for itching, rash and wound.   Neurological:  Negative for dizziness, extremity weakness, headaches, light-headedness and numbness.         ROS completed? Yes      Infusion Readiness:  - Assessment Concerns Related to Infusion: No  - Provider notified: n/a      Document Below Only If Indicated:   New Patient Education:    N/A (returning patient for continuation of therapy. Ongoing education provided as needed.)        Treatment Conditions & Drug Specific Questions:    Zoledronic Acid  (RECLAST)    (Unless otherwise specified on patient specific therapy plan):     TREATMENT CONDITIONS:  Unless otherwise specified on patient specific therapy plan HOLD and notify provider prior to proceeding with treatment if:   o Creatinine clearance LESS THAN 35 mL/Minute  o Corrected serum calcium LESS THAN 8.6 mg/dL   OR   Ionized calcium LESS THAN 1.1 mmol  o Recent (within 4 weeks) or planned invasive dental procedure.  -           Positive Pregnancy     Lab Results   Component Value Date    CREATININE 0.66 11/08/2024      Lab Results   Component Value Date    CALCIUM 9.3 11/08/2024    PHOS 3.2 08/16/2024      No results found for: \"CAION\"    CrCl: 55.8  Corrected calcium: 9.3    Labs reviewed and patient meets treatment conditions? " Yes    DRUG SPECIFIC QUESTIONS:  Is the patient taking a Calcium and Vitamin D supplement?  Yes  (Recommended)    Is the patient receiving Zometa or do they have an allergy to Zometa?  No    Is the patient aware of the adverse effects which may include bone fractures, hypocalcemia, influenza-like illness, musculoskeletal pain, ocular inflammation, and osteonecrosis of the jaw? Yes      REMINDERS:  PREGNANCY CATEGORY X DRUG. OBTAIN NEGATIVE PREGNANCY TEST PRIOR TO FIRST INFUSION FOR WOMEN OF CHILDBEARING ABILITY     Recommended Vitals/Observation:  Monitor vital signs before infusion, at the end of the infusion and as needed        Weight Based Drug Calculations:    WEIGHT BASED DRUGS: NOT APPLICABLE / FLAT DOSE          Initiated By: Pavithra Caraballo RN

## 2024-11-11 NOTE — PATIENT INSTRUCTIONS
Today :We administered zoledronic acid.     For:   1. Osteopenia of multiple sites         Your next appointment is due in:  one year        Please read the  Medication Guide that was given to you and reviewed during todays visit.     (Tell all doctors including dentists that you are taking this medication)     Go to the emergency room or call 911 if:  -You have signs of allergic reaction:   -Rash, hives, itching.   -Swollen, blistered, peeling skin.   -Swelling of face, lips, mouth, tongue or throat.   -Tightness of chest, trouble breathing, swallowing or talking     Call your doctor:  - If IV / injection site gets red, warm, swollen, itchy or leaks fluid or pus.     (Leave dressing on your IV site for at least 2 hours and keep area clean and dry  - If you get sick or have symptoms of infection or are not feeling well for any reason.    (Wash your hands often, stay away from people who are sick)  - If you have side effects from your medication that do not go away or are bothersome.     (Refer to the teaching your nurse gave you for side effects to call your doctor about)    - Common side effects may include:  stuffy nose, headache, feeling tired, muscle aches, upset stomach  - Before receiving any vaccines     - Call [facility] at   If:  - You get sick, are on antibiotics, have had a recent vaccine, have surgery or dental work and your doctor wants your visit rescheduled.  - You need to cancel and reschedule your visit for any reason. Call at least 2 days before your visit if you need to cancel.   - Your insurance changes before your next visit.    (We will need to get approval from your new insurance. This can take up to two weeks.)     The [facility] is opened Monday thru Friday. We are closed on weekends and holidays.   Voice mail will take your call if the center is closed. If you leave a message please allow 24 hours for a call back during weekdays. If you leave a message  on a weekend/holiday, we will call you back the next business day.    A pharmacist is available Monday - Friday from 8:30AM to 3:30PM to help answer any questions you may have about your prescriptions(s). Please call pharmacy at:    [facility]: [phone]  [facility]: [phone]  [facility]: [phone]

## 2025-02-23 NOTE — PROGRESS NOTES
February 23, 2025     Patient: Amparo Dobbs   YOB: 1975   Date of Visit: 2/23/2025       To Whom it May Concern:    Amparo Dobbs was seen in my clinic on 2/23/2025. She may return to work on 2/25/2025 .    If you have any questions or concerns, please don't hesitate to call.         Sincerely,          LEATHA Norris        CC: No Recipients   Outpatient Clinical Pharmacy Visit  Monica Arriaga is a 84 y.o. female who was referred to the ambulatory Clinical Pharmacy Team for her osteoporosis management.    Referring Provider: TIGRE Hicks    Subjective   Allergies   Allergen Reactions    Clindamycin GI Upset, Diarrhea and Nausea/vomiting       Osteoporosis Pharmacotherapy Assessment  Current Osteoporosis Pharmacotherapy  Reclast 5 mg IV, last administered 11/15/2023  Calcium, unsure  Vitamin D, unsure  Estrogen therapy: No    Historical Osteoporosis Pharmacotherapy  Reclast 5 mg IV once yearly    Bone Health Evaluation  DEXA Results   Results as of 11/4/24  Spine L1-L4 T-score: -1  Left femur total T-score: -1.4  Left femur neck T-score: -1.8    WHO Criteria for Osteoporosis   Category T-Score   Normal >= -1.0 SD   Osteopenia <-1.0 and >-2.5   Osteoporosis <=-2.5   Severe or Established Osteoporosis <=-2.5 and the presence of one or more fragility fractures     Fracture History  Fragility fractures: No  Vertebral fractures: No  Both wrists and ankle have been broken previously due to fall/impact    Lifestyle  Alcohol: Yes, describe: Will drink wine, approximately 2 glasses of wine/day  Smoking: No    Additional Considerations  Pregnant: No  Planned upcoming dental/oral procedures: No; however, has 8 dental implants; has been told by dentist that she will need to [place]  Patient is at risk for infection due to immunosuppression, sickness, skin wounds, etc.: No    Insurance Coverage/Administration  Coverage: Medical (administered in ambulatory infusion center)  PA status: N/A  PA on file: N/A  PA expiration: N/A  Copay: N/A  Additional information: N/A    Laboratory Results  Lab Results   Component Value Date    BILITOT 0.9 09/24/2024    CALCIUM 9.5 09/24/2024    CO2 29 09/24/2024     09/24/2024    CREATININE 0.60 09/24/2024    GLUCOSE 80 09/24/2024    ALKPHOS 99 09/24/2024    K 4.2 09/24/2024    PROT 6.6 09/24/2024     09/24/2024  "   AST 28 09/24/2024    ALT 18 09/24/2024    BUN 9 09/24/2024    ANIONGAP 14 09/24/2024    MG 1.96 08/16/2024    PHOS 3.2 08/16/2024     01/09/2020    ALBUMIN 4.2 09/24/2024    LIPASE 6 (L) 08/15/2024    GFRF 86 10/31/2022    EGFR 90 09/24/2024     Lab Results   Component Value Date    TRIG 65 09/30/2023    CHOL 251 (H) 08/23/2024    LDLCALC 155 09/30/2023    HDL 62.5 08/23/2024     Lab Results   Component Value Date    HGBA1C 5.1 08/23/2024     No results found for: \"[id number]\"   Lab Results   Component Value Date    VITD25 70 08/23/2024       Assessment/Plan   Problem List Items Addressed This Visit    None    General Patient Discussion  Patient is adamant that she does not wish to pursue any other drug therapy than Reclast. Due to previous adverse reaction with clindamycin, patient states she is sensitive to various medications.   Notes urgent timetable- Reclast infusion must be completed before November 19th, as patient will be permanently moving to [place] on this date (flying out this date)  Patient unable to specifically clarify calcium/VitD supplementation, however, has been taking \"the same supplements for years\" and per review of recent Calcium and Vit D lab values, supplementation appears appropriate. Patient notes taking multiple multivitamins/supplements with calcium and VitD.  Per review of recent DEXA scans completed 11/4/24, bone density seems to have improved compared to 11/2022 imaging. Continuing once-yearly Reclast infusions appear clinically appropriate.   Reclast Education  Mechanism of action: An antiresorptive agent  Dosing: Patient will receive a 5 mg IV infusion  Duration: Anticipated to be long-term, unless patient transitions to an alternative agent for osteoporosis management.   Benefits of therapy  Risks of therapy:  Hypocalcemia  Osteonecrosis of the Jaw (ONJ) (rare)  Suppression of bone turnover, as evidenced by ONJ, atypical fractures, and/or delayed fracture healing  Common " side effects  Anticipated response to therapy and monitoring: Calcium, bloodwork, DEXA scans, etc.  Administration plan: Patient to obtain infusion at [facility] due to urgent travel timeline.   Answered all questions and addressed all concerns     Plan/Changes   Continue all meds under the continuation of care with the referring provider and clinical pharmacy team  Start Reclast 5 mg IV infusion, [facility].   Continue Calcium and Vitamin D supplementation    Next PCP Follow-Up  TBD    Next Clinical Pharmacy Follow-Up  As needed, patient provided with my phone number to contact if any issues/concerns.       Renay Graham, PharmD     Verbal consent to manage patient's drug therapy was obtained from the patient. They were informed they may decline to participate or withdraw from participation in pharmacy services at any time.

## 2025-04-07 ENCOUNTER — HOSPITAL ENCOUNTER (OUTPATIENT)
Dept: RADIOLOGY | Facility: HOSPITAL | Age: 85
Discharge: HOME | End: 2025-04-07
Payer: MEDICARE

## 2025-04-07 DIAGNOSIS — M76.02 GLUTEAL TENDINITIS, LEFT HIP: ICD-10-CM

## 2025-04-07 PROCEDURE — 73721 MRI JNT OF LWR EXTRE W/O DYE: CPT | Mod: LT

## 2025-04-11 ENCOUNTER — APPOINTMENT (OUTPATIENT)
Dept: RADIOLOGY | Facility: HOSPITAL | Age: 85
End: 2025-04-11
Payer: MEDICARE

## 2025-07-23 ENCOUNTER — OFFICE VISIT (OUTPATIENT)
Dept: PRIMARY CARE | Facility: CLINIC | Age: 85
End: 2025-07-23
Payer: MEDICARE

## 2025-07-23 ENCOUNTER — TELEPHONE (OUTPATIENT)
Dept: PRIMARY CARE | Facility: CLINIC | Age: 85
End: 2025-07-23

## 2025-07-23 ENCOUNTER — HOSPITAL ENCOUNTER (OUTPATIENT)
Dept: RADIOLOGY | Facility: CLINIC | Age: 85
Discharge: HOME | End: 2025-07-23
Payer: MEDICARE

## 2025-07-23 VITALS
BODY MASS INDEX: 24.11 KG/M2 | SYSTOLIC BLOOD PRESSURE: 122 MMHG | DIASTOLIC BLOOD PRESSURE: 78 MMHG | HEART RATE: 84 BPM | WEIGHT: 118 LBS | OXYGEN SATURATION: 97 %

## 2025-07-23 DIAGNOSIS — M43.16 SPONDYLOLISTHESIS OF LUMBAR REGION: ICD-10-CM

## 2025-07-23 DIAGNOSIS — M54.16 LUMBAR BACK PAIN WITH RADICULOPATHY AFFECTING RIGHT LOWER EXTREMITY: Primary | ICD-10-CM

## 2025-07-23 DIAGNOSIS — M54.16 LUMBAR BACK PAIN WITH RADICULOPATHY AFFECTING RIGHT LOWER EXTREMITY: ICD-10-CM

## 2025-07-23 PROCEDURE — 99214 OFFICE O/P EST MOD 30 MIN: CPT | Performed by: FAMILY MEDICINE

## 2025-07-23 PROCEDURE — 1159F MED LIST DOCD IN RCRD: CPT | Performed by: FAMILY MEDICINE

## 2025-07-23 PROCEDURE — 72114 X-RAY EXAM L-S SPINE BENDING: CPT | Performed by: RADIOLOGY

## 2025-07-23 PROCEDURE — 72114 X-RAY EXAM L-S SPINE BENDING: CPT

## 2025-07-23 PROCEDURE — 1124F ACP DISCUSS-NO DSCNMKR DOCD: CPT | Performed by: FAMILY MEDICINE

## 2025-07-23 PROCEDURE — 1036F TOBACCO NON-USER: CPT | Performed by: FAMILY MEDICINE

## 2025-07-23 RX ORDER — PREDNISONE 10 MG/1
30 TABLET ORAL DAILY
Qty: 15 TABLET | Refills: 0 | Status: SHIPPED | OUTPATIENT
Start: 2025-07-23

## 2025-07-23 RX ORDER — PREDNISONE 10 MG/1
30 TABLET ORAL DAILY
Qty: 15 TABLET | Refills: 0 | Status: SHIPPED | OUTPATIENT
Start: 2025-07-23 | End: 2025-07-23

## 2025-07-23 RX ORDER — PREDNISONE 10 MG/1
30 TABLET ORAL DAILY
Qty: 15 TABLET | Refills: 0 | Status: CANCELLED | OUTPATIENT
Start: 2025-07-23

## 2025-07-23 ASSESSMENT — ENCOUNTER SYMPTOMS
DIAPHORESIS: 0
BACK PAIN: 0
NUMBNESS: 1
DIFFICULTY URINATING: 0
FEVER: 0
CHILLS: 0

## 2025-07-23 NOTE — PATIENT INSTRUCTIONS
"Problems that are unlikely, but that would warrant calling 911 or going to the nearest emergency room, are weakness in both arms or both legs, numbness between the legs (in the \"saddle\" area), loss of bowel or bladder control, or a progressively worse symptom (especially weakness, numbness, tingling, pain, nerve pain). Please return earlier or seek immediate medical attention if any question or concern.    Please keep your appointment with Dr. Melgoza.    Please return or seek medical attention if symptoms persist, change, worsen, or return. For emergencies, call 9-1-1 or go to the nearest Emergency Room. Please schedule additional problem-focused appointment(s) to address additional problem(s). Simply mentioning or talking briefly about a problem or concern does not necessarily mean it is currently being addressed. Time constraints dictate that not every problem/concern can always be addressed.    Avoid taking Biotin (a vitamin, shows up particularly in hair/nail supplements) for a week prior to any blood tests, as it can interfere with certain results. Fasting for labs means 12 hours, nothing to eat or drink, except water and medications, unless directed otherwise.    For assistance with scheduling referrals or consultations, please call [phone]. For laboratory, radiology, and other tests, please call [phone] ([phone] for pediatrics). Please review prescription inserts and published information for possible adverse effects of all medications. Return after testing or consultation to review results and recommendations, if symptoms persist, change, worsen, or return, or if you have any question or concern. If you do not get results within 7-10 days, or you have any question or concern, please send a message, call the office ([phone]), or return to the office for a follow-up appointment. For non-emergencies, you may call the office. For emergencies, call 9-1-1 or go to the nearest Emergency " Department. Please schedule additional appointment(s) to address concern(s) not addressed today. An annual Wellness visit is strongly recommended. A Wellness visit should be dedicated to addressing routine health maintenance matters (e.g., cancer screenings, cardiovascular screening, etc.). Problem-focused visits, typically prompted by symptoms or specific concerns, are usually conducted separately, particularly if multiple or complex problems need to be addressed.    In general, results are not released or discussed over the telephone, but at an appointment or via  [employer]. Results of tests done through [facility] are released via  [employer] (see below).  [email or web address]   [employer] support line: [phone]

## 2025-07-23 NOTE — ASSESSMENT & PLAN NOTE
Orders:    XR lumbar spine 6+ views including oblique flexion extension; Future    MR lumbar spine wo IV contrast; Future    predniSONE (Deltasone) 10 mg tablet; Take 3 tablets (30 mg) by mouth once daily.

## 2025-07-23 NOTE — PROGRESS NOTES
"Subjective   Patient ID: Monica Arriaga is a 84 y.o. female who presents for Sciatica (Pt presents c/o sciatica pain R sided x 2 wks, has an appt with Dr Melgoza 7/30/25, hoping to get medication to help with  the pain.).  HPI Historian(s): Self    c/o severe right sciatica pain. Pain goes all the way down the leg, all the way to the foot. Numbness in right 2 little toes, in a band all the way down the leg. Tried ibuprofen, helps a little.    Denies weakness, tingling, weakness in both legs (but does feel as if knees are weak), numbness between the legs (in the \"saddle\" area), loss of bowel or bladder control though she does have urgency, or a progressively worse neurological symptom (weakness, numbness, tingling, pain, nerve pain).     Review of Systems   Constitutional:  Negative for chills, diaphoresis and fever.   Genitourinary:  Negative for difficulty urinating and enuresis.   Musculoskeletal:  Negative for back pain.   Neurological:  Positive for numbness.   All other systems reviewed and are negative.    No LMP recorded.    Tobacco Use History[1]  Social History     Substance and Sexual Activity   Alcohol Use Yes    Alcohol/week: 7.0 - 14.0 standard drinks of alcohol    Types: 7 - 14 Standard drinks or equivalent per week     Social History     Substance and Sexual Activity   Drug Use Never       Family History[2]    Patient Care Team:  KATINA Hicks-CNP as PCP - General (Internal Medicine)    RX Allergies[3]    Prior to Admission medications   Medication Sig Start Date End Date Taking? Authorizing Provider   calcium carb-vit D3-magnesium 250-200-125 mg-unit-mg capsule Take by mouth. 1/27/11  Yes Historical Provider, MD   finasteride (Proscar) 5 mg tablet Take 1 tablet (5 mg) by mouth once daily. 5/23/24  Yes Historical Provider, MD   ibuprofen 200 mg tablet Take 3 tablets (600 mg) by mouth every 6 hours if needed for mild pain (1 - 3).   Yes Historical Provider, MD   lactobacillus (Culturelle) 10 " billion cell capsule Take by mouth. 1/27/11  Yes Historical Provider, MD   multivitamin tablet Take 1 tablet by mouth once daily. 4/12/12  Yes Historical Provider, MD   omega-3 1,000 mg capsule capsule Take 2 to 4 g of DHA and EPA as a combined total daily. 4/12/12  Yes Historical Provider, MD   UNABLE TO FIND Take 1 capsule by mouth once daily. Med Name: Ultra eye vitamin   Yes Historical Provider, MD   VITAMIN B COMPLEX ORAL Take 1 tablet by mouth once daily. 4/12/12  Yes Historical Provider, MD   zoledronic acid (Reclast) 5 mg/100 mL piggyback Infuse 100 mL (5 mg) at 400 mL/hr over 15 minutes into a venous catheter yearly. 11/7/24  Yes TIGRE Hicks   acetaminophen (Tylenol) 325 mg tablet Take 2 tablets (650 mg) by mouth every 6 hours if needed for mild pain (1 - 3).  Patient not taking: Reported on 7/23/2025    Historical Provider, MD   albuterol (Ventolin HFA) 90 mcg/actuation inhaler Inhale 2 puffs every 4 hours if needed for wheezing or shortness of breath. 4/18/24 5/28/24  TIGRE Jesus        Objective     Vitals:    07/23/25 1229   BP: 122/78   Pulse: 84   SpO2: 97%   Weight: 53.5 kg (118 lb)              Physical Exam  Vitals and nursing note reviewed.   Constitutional:       General: She is not in acute distress.     Appearance: Normal appearance.      Comments: No assistive device presently being used.   HENT:      Head: Normocephalic and atraumatic.     Eyes:      General: No scleral icterus.     Extraocular Movements: Extraocular movements intact.      Conjunctiva/sclera: Conjunctivae normal.     Pulmonary:      Effort: Pulmonary effort is normal. No respiratory distress.     Skin:     General: Skin is warm and dry.      Coloration: Skin is not jaundiced.     Neurological:      Mental Status: She is alert and oriented to person, place, and time. Mental status is at baseline.      Sensory: Sensory deficit (right S1 dermatome) present.      Motor: No weakness.      Gait: Gait  abnormal (antalgic).      Deep Tendon Reflexes:      Reflex Scores:       Patellar reflexes are 1+ on the right side and 1+ on the left side.       Achilles reflexes are 1+ on the right side and 1+ on the left side.    Psychiatric:         Behavior: Behavior normal.         Assessment & Plan  Lumbar back pain with radiculopathy affecting right lower extremity    Orders:    XR lumbar spine 6+ views including oblique flexion extension; Future    MR lumbar spine wo IV contrast; Future    predniSONE (Deltasone) 10 mg tablet; Take 3 tablets (30 mg) by mouth once daily.    Spondylolisthesis of lumbar region    Orders:    XR lumbar spine 6+ views including oblique flexion extension; Future    MR lumbar spine wo IV contrast; Future    predniSONE (Deltasone) 10 mg tablet; Take 3 tablets (30 mg) by mouth once daily.                      [1]   Social History  Tobacco Use   Smoking Status Never   Smokeless Tobacco Never   [2] No family history on file.  [3]   Allergies  Allergen Reactions    Clindamycin GI Upset, Diarrhea and Nausea/vomiting

## 2025-07-24 ENCOUNTER — TELEPHONE (OUTPATIENT)
Dept: PRIMARY CARE | Facility: CLINIC | Age: 85
End: 2025-07-24
Payer: MEDICARE

## 2025-07-24 NOTE — TELEPHONE ENCOUNTER
Pt calling [facility] put an order in for an MRI but she is going to have it done at [facility] so she needs a new order to be faxed over to them. She needs this done asap b/c her appt is 7/30.

## 2025-07-26 ENCOUNTER — TELEPHONE (OUTPATIENT)
Dept: PRIMARY CARE | Facility: CLINIC | Age: 85
End: 2025-07-26
Payer: MEDICARE

## 2025-07-26 NOTE — TELEPHONE ENCOUNTER
Covering for Dr. Parkinson, Patient called  Currently on prednisone for low back pain with numbness/tingling. Numbness/tingling getting worse, now on other leg. Has MRI scheduled for Monday in 2 days, as well as follow up with ortho scheduled next week. Advised to keep appointments so can get further treatment depending on results.

## 2025-07-29 ENCOUNTER — APPOINTMENT (OUTPATIENT)
Dept: RADIOLOGY | Facility: HOSPITAL | Age: 85
End: 2025-07-29
Payer: MEDICARE